# Patient Record
Sex: FEMALE | Race: BLACK OR AFRICAN AMERICAN
[De-identification: names, ages, dates, MRNs, and addresses within clinical notes are randomized per-mention and may not be internally consistent; named-entity substitution may affect disease eponyms.]

---

## 2017-02-10 ENCOUNTER — HOSPITAL ENCOUNTER (EMERGENCY)
Dept: HOSPITAL 61 - ER | Age: 32
Discharge: HOME | End: 2017-02-10
Payer: COMMERCIAL

## 2017-02-10 VITALS
SYSTOLIC BLOOD PRESSURE: 118 MMHG | DIASTOLIC BLOOD PRESSURE: 82 MMHG | SYSTOLIC BLOOD PRESSURE: 118 MMHG | DIASTOLIC BLOOD PRESSURE: 82 MMHG

## 2017-02-10 DIAGNOSIS — Z90.721: ICD-10-CM

## 2017-02-10 DIAGNOSIS — Z98.890: ICD-10-CM

## 2017-02-10 DIAGNOSIS — T19.2XXA: Primary | ICD-10-CM
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
## 2017-02-10 PROCEDURE — 87591 N.GONORRHOEAE DNA AMP PROB: CPT

## 2017-02-10 PROCEDURE — 87491 CHLMYD TRACH DNA AMP PROBE: CPT

## 2017-02-10 PROCEDURE — 99284 EMERGENCY DEPT VISIT MOD MDM: CPT

## 2017-02-10 NOTE — PHYS DOC
Past Medical History


Past Medical History:  Other


Additional Past Medical Histor:  Ovarian CA, Scoliosis


Past Surgical History:  , Oophorectomy, Other


Additional Past Surgical Histo:  Hernia repair


Alcohol Use:  None


Drug Use:  None





Adult General


Chief Complaint


Chief Complaint:  FOREIGN BODY VAGINA





HPI


HPI


Patient is a 31  year old female presents to the emergency department with a 

history of placed a cervical cap for her menstrual cycle. Patient states she 

has not been able to get the cap removed. She states she placed the yesterday. C

/o right lower abdominal pain. Denies vaginal discharge.





Review of Systems


Review of Systems





Constitutional: Denies fever or chills []


Eyes: Denies change in visual acuity, redness, or eye pain []


HENT: Denies nasal congestion or sore throat []


Respiratory: Denies cough or shortness of breath []


Cardiovascular: No additional information not addressed in HPI []


GI: Denies abdominal pain, nausea, vomiting, bloody stools or diarrhea []


: Denies dysuria or hematuria. Patient c/o unable to remove cervical cap []


Musculoskeletal: Denies back pain or joint pain []


Integument: Denies rash or skin lesions []


Neurologic: Denies headache, focal weakness or sensory changes []





Allergies


Allergies





 Allergies








Coded Allergies Type Severity Reaction Last Updated Verified


 


  No Known Drug Allergies    3/17/14 No











Physical Exam


Physical Exam





Constitutional: Well developed, well nourished, no acute distress, non-toxic 

appearance. []


HENT: Normocephalic, atraumatic, bilateral external ears normal, oropharynx 

moist, no oral exudates, nose normal. []


Eyes: PERRLA, EOMI, conjunctiva normal, no discharge. [] 


Neck: Normal range of motion, no tenderness, supple, no stridor. [] 


Cardiovascular:Heart rate regular rhythm, no murmur []


Lungs & Thorax:  Bilateral breath sounds clear to auscultation []


Abdomen: Bowel sounds hypoactive, soft, no tenderness, no masses, no pulsatile 

masses. [] 


Skin: Warm, dry, no erythema, no rash. [] 


Back: No tenderness,


Extremities: No tenderness, no cyanosis, no clubbing, ROM intact, no edema. [] 


Neurologic: Alert and oriented X 3, normal motor function, normal sensory 

function, no focal deficits noted. []


Psychologic: Affect normal, judgement normal, mood normal. []


Pelvic exam completed with cervical Noted. Cervical cap Was removed with slight 

difficulty. Cervical cap was completely intact. Foul odor was noted. Manaul 

exam with tenderness noted right adnexal and CMT no left adnexal tenderness 

noted.





Current Patient Data


Vital Signs





 Vital Signs








  Date Time  Temp Pulse Resp B/P Pulse Ox O2 Delivery O2 Flow Rate FiO2


 


2/10/17 10:02 98.5 106 16 118/82 100 Room Air  





 98.5       








Lab Values














Microbiology


2/10/17 Wet Prep - Final, Complete





EKG


EKG


[]





Radiology/Procedures


Radiology/Procedures


[]





Course & Med Decision Making


Course & Med Decision Making


Pertinent Labs and Imaging studies reviewed. (See chart for details)


Wet prep was negative. Patient will be placed on doxycycline 1 tablet twice a 

day for the next 7 days. Recommended following up with OB/GYN. Patient was 

provided with signs and symptoms to return back to emergency department. 

Recommended patient to avoid sexual intercourse until she is off the 

doxycycline for at least a week. A shunt agrees with discharge instructions 

treatment regimens and follow-up recommendations.


[]





Dragon Disclaimer


Dragon Disclaimer


This electronic medical record was generated, in whole or in part, using a 

voice recognition dictation system.





Departure


Departure


Impression:  


 Primary Impression:  


 Vaginal foreign body


Disposition:   HOME, SELF-CARE


Condition:  STABLE


Referrals:  


DANNY HOLM MD (PCP)








ROBERT BRIGHT MD


Patient Instructions:  Vaginal Foreign Body, Easy-to-Read





Additional Instructions:


Activity as tolerated.


Medications as prescribed.


Tylenol or ibuprofen for pain and discomfort.


Avoid sexual intercourse for 2 weeks.


Follow-up with OB/GYN within the next week.


Return back to emergency percent symptoms of become worse.


Scripts


Doxycycline Hyclate 100 Mg Capsule1 Cap PO BID #14 CAP


   Prov:ADRYAN GALLEGO NP         2/10/17








ADRYAN GALLEGO NP Feb 10, 2017 10:24

## 2017-03-16 ENCOUNTER — HOSPITAL ENCOUNTER (OUTPATIENT)
Dept: HOSPITAL 61 - US | Age: 32
Discharge: HOME | End: 2017-03-16
Attending: NURSE PRACTITIONER
Payer: COMMERCIAL

## 2017-03-16 DIAGNOSIS — N83.201: Primary | ICD-10-CM

## 2017-03-16 PROCEDURE — 76856 US EXAM PELVIC COMPLETE: CPT

## 2017-03-16 NOTE — RAD
Examination: Transabdominal Ultrasound pelvis



History: History of abnormal menses



Comparison: None available



Findings:



The uterus measures 8.8 x 4.7 x 2.6 cm.



The endometrium measures 1 cm in thickness. There is some echogenicity

identified within the endometrium with fluid within probably blood however in

the lower portion of the endometrium , there is a 4 mm echogenicity,

nonspecific could be of blood or blood products or a small polyp.



The right ovary measures 2.7 x 1.8 x 2.0 cm. The left ovary measures 2.2 x 1.6

x 1.4 cm.



Blood flow identified in the right and left ovaries.



There is a small cystic structure measuring 1 cm identified in the right ovary

probably a follicle.



Impression:



1. Fluid identified within the endometrium with some echogenicity within

probably blood within the endometrium. There is a 4 mm echogenicity identified

in the lower portion of the endometrium could be a blood or blood product or a

tiny polyp. Differentiation is difficult. Transvaginal ultrasound examination

can be useful better evaluation.



2. 1 cm cystic structure identified in the right ovary likely a follicle.

## 2017-04-19 ENCOUNTER — HOSPITAL ENCOUNTER (EMERGENCY)
Dept: HOSPITAL 61 - ER | Age: 32
Discharge: HOME | End: 2017-04-19
Payer: COMMERCIAL

## 2017-04-19 VITALS
SYSTOLIC BLOOD PRESSURE: 110 MMHG | DIASTOLIC BLOOD PRESSURE: 78 MMHG | DIASTOLIC BLOOD PRESSURE: 78 MMHG | SYSTOLIC BLOOD PRESSURE: 110 MMHG

## 2017-04-19 DIAGNOSIS — G43.909: ICD-10-CM

## 2017-04-19 DIAGNOSIS — J06.9: Primary | ICD-10-CM

## 2017-04-19 PROCEDURE — 96372 THER/PROPH/DIAG INJ SC/IM: CPT

## 2017-04-19 PROCEDURE — 99284 EMERGENCY DEPT VISIT MOD MDM: CPT

## 2017-04-19 NOTE — PHYS DOC
Past Medical History


Past Medical History:  Other


Additional Past Medical Histor:  Ovarian CA, Scoliosis


Past Surgical History:  , Oophorectomy, Other


Additional Past Surgical Histo:  Hernia repair


Alcohol Use:  None


Drug Use:  None





Adult General


Chief Complaint


Chief Complaint:  Congestion





HPI


HPI


Patient is a 32  year old female with history of migraine headaches who 

presents today with 8 out of 10 frontal headache, nasal congestion, and 

coughing that began yesterday. Patient denies any fever. Patient states her 

headache is consistent with her normal migraines. She states she has tried 

Imitrex and promethazine with no relief. Denies this being the worst headache 

in her life. Denies any fever. Denies any nausea vomiting and right now. She is 

complaining of photophobia as well.





Review of Systems


Review of Systems





Constitutional: Denies fever or chills []


Eyes: Denies change in visual acuity, redness, or eye pain []


HENT:  nasal congestion


Respiratory: Denies cough or shortness of breath []


Cardiovascular: No additional information not addressed in HPI []


GI: See history of present illness


: Denies dysuria or hematuria []


Musculoskeletal: Denies back pain or joint pain []


Integument: Denies rash or skin lesions []


Neurologic:  headache


Endocrine: Denies polyuria or polydipsia []





Current Medications


Current Medications








 Current Medications








 Medications


  (Trade)  Dose


 Ordered  Sig/Axel  Start Time


 Stop Time Status Last Admin


Dose Admin


 


 Diphenhydramine


 HCl


  (Benadryl)  25 mg  1X  ONCE  17 17:45


 17 17:46 DC  


 


 


 Ketorolac


 Tromethamine


  (Toradol Im)  60 mg  1X  ONCE  17 17:45


 17 17:46 DC  


 


 


 Prednisone


  (Prednisone)  60 mg  1X  ONCE  17 17:45


 17 17:46 DC  


 


 


 Prochlorperazine


 Edisylate


  (Compazine)  10 mg  1X  ONCE  17 17:45


 17 17:46 DC  


 














Allergies


Allergies





 Allergies








Coded Allergies Type Severity Reaction Last Updated Verified


 


  No Known Drug Allergies    3/17/14 No











Physical Exam


Physical Exam





Constitutional: Well developed, well nourished, no acute distress, non-toxic 

appearance. []


HENT: Normocephalic, atraumatic, bilateral external ears normal, oropharynx 

moist, no oral exudates, nose normal. []


Eyes: PERRLA, EOMI, conjunctiva normal, no discharge. [] 


Neck: Normal range of motion, no tenderness, supple, no stridor. [] 


Cardiovascular:Heart rate regular rhythm, no murmur []


Lungs & Thorax:  Bilateral breath sounds clear to auscultation []


Abdomen: Bowel sounds normal, soft, no tenderness, no masses, no pulsatile 

masses. [] 


Skin: Warm, dry, no erythema, no rash. [] 


Back: No tenderness, no CVA tenderness. [] 


Extremities: No tenderness, no cyanosis, no clubbing, ROM intact, no edema. [] 


Neurologic: Alert and oriented X 3, normal motor function, normal sensory 

function, no focal deficits noted. Cranial nerves II through XII intact


Psychologic: Affect normal, judgement normal, mood normal. []





Current Patient Data


Vital Signs





 Vital Signs








  Date Time  Temp Pulse Resp B/P Pulse Ox O2 Delivery O2 Flow Rate FiO2


 


17 17:32 98.1 98 20  98 Room Air  





 98.1       











EKG


EKG


[]





Radiology/Procedures


Radiology/Procedures


[]





Course & Med Decision Making


Course & Med Decision Making


Pertinent Labs and Imaging studies reviewed. (See chart for details)





Patient is in the ED with symptoms of an upper respiratory infection as well as 

a migraine headache. She has history of migraine headaches. She was discharged 

with Flonase, Tessalon Perles, and Claritin-D. Follow-up with her PCP in 1-2 

weeks.





Dragon Disclaimer


Dragon Disclaimer


This electronic medical record was generated, in whole or in part, using a 

voice recognition dictation system.





Departure


Departure


Impression:  


 Primary Impression:  


 Upper respiratory infection


 Additional Impressions:  


 Migraine headache


 Cough


Disposition:  01 HOME, SELF-CARE


Condition:  STABLE


Referrals:  


DANNY HOLM MD (PCP)


Follow-up with your doctor in 1-2 weeks


Patient Instructions:  Cough, Adult, Easy-to-Read, Migraine Headache, Upper 

Respiratory Infection, Adult





Additional Instructions:


You were seen with symptoms consistent of an upper respiratory infection as 

well as a migraine headache. Take the prescribed medicines as ordered. Follow-

up with your doctor in the next 7 days, come back to the ED symptoms worsen.


Scripts


Cetirizine Hcl/Pseudoephedrine (Zyrtec-D Tablet)1 Each Tab.er.12h1 Tab PO BID #

30 TAB


   Prov:MUTUNGA,PANCHO ANTOLIN         17


Cyclobenzaprine Hcl 10 Mg Tablet1 Tab PO TID #30 TAB


   Prov:QUINNPANCHO ANTOLIN         17


Fluticasone Propionate (Flonase Allergy Relief)9.9 Ml [place].susp2 Sprays NS 

DAILY #1 BOTTLE


   Prov:PANCHO BALL         17


Benzonatate (Tessalon Perle)100 Mg Capsule1 Cap PO TID #30 CAP


   Prov:PANCHO BALL         17


Prednisone 50 Mg Tablet1 Tab PO DAILY #4 TAB


   Prov:QUINNPANCHO ANTOLIN         17





Problem Qualifiers








 Primary Impression:  


 Upper respiratory infection


 URI type:  unspecified URI  Qualified Code:  J06.9 - Acute upper respiratory 

infection, unspecified


 Additional Impressions:  


 Migraine headache


 Migraine type:  unspecified  Status migrainosus presence:  without status 

migrainosus  Intractability:  not intractable  Qualified Code:  G43.909 - 

Migraine, unspecified, not intractable, without status migrainosus





PANCHO BALL 2017 17:47

## 2017-06-27 ENCOUNTER — HOSPITAL ENCOUNTER (OUTPATIENT)
Dept: HOSPITAL 61 - RAD | Age: 32
Discharge: HOME | End: 2017-06-27
Attending: FAMILY MEDICINE
Payer: COMMERCIAL

## 2017-06-27 DIAGNOSIS — J45.901: Primary | ICD-10-CM

## 2017-06-27 DIAGNOSIS — M41.84: ICD-10-CM

## 2017-06-27 PROCEDURE — 71020: CPT

## 2017-06-27 NOTE — RAD
Indication: Asthma attack.



Time of exam 12:35 PM



Correlation is made with prior study from 1/23/2017.



Right convexity thoracic scoliotic curvature is noted. The heart size is

stable. The lungs are clear. No infiltrate is detected. No effusion or

pneumothorax is identified.



Impression: No acute cardiopulmonary process is detected.

## 2017-10-25 ENCOUNTER — HOSPITAL ENCOUNTER (EMERGENCY)
Dept: HOSPITAL 61 - ER | Age: 32
Discharge: HOME | End: 2017-10-25
Payer: COMMERCIAL

## 2017-10-25 VITALS — SYSTOLIC BLOOD PRESSURE: 97 MMHG | DIASTOLIC BLOOD PRESSURE: 56 MMHG

## 2017-10-25 VITALS — BODY MASS INDEX: 19.83 KG/M2 | HEIGHT: 60 IN | WEIGHT: 101 LBS
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
## 2017-10-25 DIAGNOSIS — S16.1XXA: Primary | ICD-10-CM

## 2017-10-25 DIAGNOSIS — R51: ICD-10-CM
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
## 2017-10-25 PROCEDURE — 70450 CT HEAD/BRAIN W/O DYE: CPT

## 2017-10-25 PROCEDURE — 72125 CT NECK SPINE W/O DYE: CPT

## 2017-10-25 NOTE — RAD
Examination: CT head and cervical spine without contrast



History: History of neck pain, injury, dizziness. Comparison: CT head from

2/5/2015



Technique: Axial CT images of the head and cervical spine was performed with

the contrast. Coronal and sagittal reformats of the cervical spine were

performed





[facility] Compliance Statement:



One or more of the following individualized dose reduction techniques were

utilized for this examination:

1. Automated exposure control

2. Adjustment of the mA and/or kV according to patient size

3. Use of iterative reconstruction technique . Findings



There is no evidence of midline shift. There is no acute intracranial bleed or

extra axial fluid collection identified. The gray-white matter different

sensation is maintained. Cavum septum pellucidum identified. The visualized

lateral ventricles, third ventricle, fourth ventricle are proper for age. The

basal cisterns are uneffaced. Probable Chiari1 formation similar to prior

exam.



The vertebral body heights are maintained. There is no obvious acute fracture

identified. The lateral masses of C1 are aligned with C2 vertebra. The C2 dens

appears intact. No evidence of prevertebral soft tissue swelling identified.

No evidence of listhesis. The apical lungs are clear. No evidence of

prevertebral soft tissue swelling. Examination cervical spine somewhat limited

due to positioning within the CT gantry.



Impression:



1. No evidence of intracranial findings.



2. 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



2. No acute fracture of the cervical spine. Correlate clinically.
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
## 2018-04-06 ENCOUNTER — HOSPITAL ENCOUNTER (EMERGENCY)
Dept: HOSPITAL 61 - ER | Age: 33
Discharge: HOME | End: 2018-04-06
Payer: COMMERCIAL

## 2018-04-06 DIAGNOSIS — G43.909: Primary | ICD-10-CM

## 2018-04-06 DIAGNOSIS — J06.9: ICD-10-CM

## 2018-04-06 LAB
INFLUENZA A PATIENT: NEGATIVE
INFLUENZA B PATIENT: NEGATIVE
OBC FLU: (no result)

## 2018-04-06 PROCEDURE — 87070 CULTURE OTHR SPECIMN AEROBIC: CPT

## 2018-04-06 PROCEDURE — 99285 EMERGENCY DEPT VISIT HI MDM: CPT

## 2018-04-06 PROCEDURE — 87880 STREP A ASSAY W/OPTIC: CPT

## 2018-04-06 PROCEDURE — 71046 X-RAY EXAM CHEST 2 VIEWS: CPT

## 2018-04-06 PROCEDURE — 96372 THER/PROPH/DIAG INJ SC/IM: CPT

## 2018-04-06 PROCEDURE — 87804 INFLUENZA ASSAY W/OPTIC: CPT

## 2018-04-06 RX ADMIN — DEXAMETHASONE SODIUM PHOSPHATE 1 MG: 4 INJECTION, SOLUTION INTRAMUSCULAR; INTRAVENOUS at 19:10

## 2018-04-06 RX ADMIN — KETOROLAC TROMETHAMINE 1 MG: 30 INJECTION, SOLUTION INTRAMUSCULAR at 19:10

## 2018-04-07 LAB
NEGATIVE OBC STREP: (no result)
POSITIVE OBC STREP: (no result)

## 2018-07-19 ENCOUNTER — HOSPITAL ENCOUNTER (OUTPATIENT)
Dept: HOSPITAL 61 - RAD | Age: 33
Discharge: HOME | End: 2018-07-19
Attending: FAMILY MEDICINE
Payer: COMMERCIAL

## 2018-07-19 DIAGNOSIS — J32.9: ICD-10-CM

## 2018-07-19 DIAGNOSIS — R06.02: Primary | ICD-10-CM

## 2018-07-19 DIAGNOSIS — G43.909: ICD-10-CM

## 2018-07-19 PROCEDURE — 71046 X-RAY EXAM CHEST 2 VIEWS: CPT

## 2018-09-30 ENCOUNTER — HOSPITAL ENCOUNTER (EMERGENCY)
Dept: HOSPITAL 61 - ER | Age: 33
Discharge: HOME | End: 2018-09-30
Payer: COMMERCIAL

## 2018-09-30 VITALS — WEIGHT: 83 LBS | HEIGHT: 60 IN | BODY MASS INDEX: 16.3 KG/M2

## 2018-09-30 VITALS — DIASTOLIC BLOOD PRESSURE: 73 MMHG | SYSTOLIC BLOOD PRESSURE: 110 MMHG

## 2018-09-30 DIAGNOSIS — R11.2: ICD-10-CM

## 2018-09-30 DIAGNOSIS — Z98.890: ICD-10-CM

## 2018-09-30 DIAGNOSIS — G43.009: Primary | ICD-10-CM

## 2018-09-30 LAB
ANION GAP SERPL CALC-SCNC: 11 MMOL/L (ref 6–14)
BASOPHILS # BLD AUTO: 0.1 X10^3/UL (ref 0–0.2)
BASOPHILS NFR BLD: 1 % (ref 0–3)
BUN SERPL-MCNC: 19 MG/DL (ref 7–20)
CALCIUM SERPL-MCNC: 9.3 MG/DL (ref 8.5–10.1)
CHLORIDE SERPL-SCNC: 104 MMOL/L (ref 98–107)
CO2 SERPL-SCNC: 24 MMOL/L (ref 21–32)
CREAT SERPL-MCNC: 0.8 MG/DL (ref 0.6–1)
EOSINOPHIL NFR BLD: 0.1 X10^3/UL (ref 0–0.7)
EOSINOPHIL NFR BLD: 1 % (ref 0–3)
ERYTHROCYTE [DISTWIDTH] IN BLOOD BY AUTOMATED COUNT: 13.1 % (ref 11.5–14.5)
GFR SERPLBLD BASED ON 1.73 SQ M-ARVRAT: 100 ML/MIN
GLUCOSE SERPL-MCNC: 91 MG/DL (ref 70–99)
HCT VFR BLD CALC: 35.3 % (ref 36–47)
HGB BLD-MCNC: 11.9 G/DL (ref 12–15.5)
LYMPHOCYTES # BLD: 2.7 X10^3/UL (ref 1–4.8)
LYMPHOCYTES NFR BLD AUTO: 43 % (ref 24–48)
MCH RBC QN AUTO: 30 PG (ref 25–35)
MCHC RBC AUTO-ENTMCNC: 34 G/DL (ref 31–37)
MCV RBC AUTO: 90 FL (ref 79–100)
MONO #: 0.4 X10^3/UL (ref 0–1.1)
MONOCYTES NFR BLD: 6 % (ref 0–9)
NEUT #: 3 X10^3UL (ref 1.8–7.7)
NEUTROPHILS NFR BLD AUTO: 48 % (ref 31–73)
PLATELET # BLD AUTO: 347 X10^3/UL (ref 140–400)
POTASSIUM SERPL-SCNC: 3.8 MMOL/L (ref 3.5–5.1)
RBC # BLD AUTO: 3.94 X10^6/UL (ref 3.5–5.4)
SODIUM SERPL-SCNC: 139 MMOL/L (ref 136–145)
WBC # BLD AUTO: 6.2 X10^3/UL (ref 4–11)

## 2018-09-30 PROCEDURE — 81025 URINE PREGNANCY TEST: CPT

## 2018-09-30 PROCEDURE — 96361 HYDRATE IV INFUSION ADD-ON: CPT

## 2018-09-30 PROCEDURE — 36415 COLL VENOUS BLD VENIPUNCTURE: CPT

## 2018-09-30 PROCEDURE — 85025 COMPLETE CBC W/AUTO DIFF WBC: CPT

## 2018-09-30 PROCEDURE — 80048 BASIC METABOLIC PNL TOTAL CA: CPT

## 2018-09-30 PROCEDURE — 99284 EMERGENCY DEPT VISIT MOD MDM: CPT

## 2018-09-30 PROCEDURE — 96375 TX/PRO/DX INJ NEW DRUG ADDON: CPT

## 2018-09-30 PROCEDURE — 96374 THER/PROPH/DIAG INJ IV PUSH: CPT

## 2018-09-30 NOTE — PHYS DOC
Past Medical History


Past Medical History:  Cancer, Other


Additional Past Medical Histor:  Ovarian CA, Scoliosis


Past Surgical History:  , Oophorectomy, Other


Additional Past Surgical Histo:  Hernia repair,R OVARY


Alcohol Use:  None


Drug Use:  None





Adult General


Chief Complaint


Chief Complaint:  HEADACHE





HPI


HPI





Patient is a 33  year old female who presents with 8/10 throbbing frontal 

headache that began on Friday which is 3 days ago. Patient states the headache 

began gradually. Patient states she's also had nausea and vomiting since the 

headache began. Patient denies any fever. She states she has history of 

migraine headaches and this is similar to her previous migraines.





Review of Systems


Review of Systems





Constitutional: Denies fever or chills []


Eyes: Denies change in visual acuity, redness, or eye pain []


HENT: Denies nasal congestion or sore throat []


Respiratory: Denies cough or shortness of breath []


Cardiovascular: No additional information not addressed in HPI []


GI: Reports nausea and vomiting. Denies abdominal pain, bloody stools or 

diarrhea []


: Denies dysuria or hematuria []


Musculoskeletal: Denies back pain or joint pain []


Integument: Denies rash or skin lesions []


Neurologic: Reports migraine headache, denies focal weakness or sensory changes 

[]








All other systems were reviewed and found to be within normal limits, except as 

documented in this note.





Current Medications


Current Medications





Current Medications








 Medications


  (Trade)  Dose


 Ordered  Sig/Axel  Start Time


 Stop Time Status Last Admin


Dose Admin


 


 Dexamethasone


 Sodium Phosphate


  (Decadron)  10 mg  1X  ONCE  18 12:30


 18 12:31 DC 18 12:49


10 MG


 


 Diphenhydramine


 HCl


  (Benadryl)  25 mg  1X  ONCE  18 12:30


 18 12:31 DC 18 12:48


25 MG


 


 Ketorolac


 Tromethamine


  (Toradol 30mg


 Vial)  30 mg  1X  ONCE  18 12:30


 18 12:31 DC 18 12:49


30 MG


 


 Prochlorperazine


 Edisylate


  (Compazine)  10 mg  1X  ONCE  18 12:30


 18 12:31 DC 18 12:49


10 MG


 


 Sodium Chloride  1,000 ml @ 


 1,000 mls/hr  1X  ONCE  18 12:30


 18 13:29 DC 18 12:47


1,000 MLS/HR











Allergies


Allergies





Allergies








Coded Allergies Type Severity Reaction Last Updated Verified


 


  No Known Drug Allergies    3/17/14 No











Physical Exam


Physical Exam





Constitutional: Well developed, well nourished, no acute distress, non-toxic 

appearance. []


HENT: Normocephalic, atraumatic, bilateral external ears normal, oropharynx 

moist, no oral exudates, nose normal. []


Eyes: PERRLA, EOMI, conjunctiva normal, no discharge. [] 


Neck: Normal range of motion, no tenderness, supple, no stridor. [] 


Cardiovascular:Heart rate regular rhythm, no murmur []


Lungs & Thorax:  Bilateral breath sounds clear to auscultation []


Abdomen: Bowel sounds normal, soft, no tenderness, no masses, no pulsatile 

masses. [] 


Skin: Warm, dry, no erythema, no rash. [] 


Back: No tenderness, no CVA tenderness. [] 


Extremities: No tenderness, no cyanosis, no clubbing, ROM intact, no edema. [] 


Neurologic: Alert and oriented X 3, normal motor function, normal sensory 

function, no focal deficits noted. Cranial nerves II through XII intact


Psychologic: Affect normal, judgement normal, mood normal. []





Current Patient Data


Vital Signs





 Vital Signs








  Date Time  Temp Pulse Resp B/P (MAP) Pulse Ox O2 Delivery O2 Flow Rate FiO2


 


18 12:04 98.2 123 16 113/78 (90) 98 Room Air  





 98.2       








Lab Values





 Laboratory Tests








Test


 18


12:13 18


12:20


 


POC Urine HCG, Qualitative


 Hcg negative


(Negative) 





 


White Blood Count


 


 6.2 x10^3/uL


(4.0-11.0)


 


Red Blood Count


 


 3.94 x10^6/uL


(3.50-5.40)


 


Hemoglobin


 


 11.9 g/dL


(12.0-15.5)  L


 


Hematocrit


 


 35.3 %


(36.0-47.0)  L


 


Mean Corpuscular Volume


 


 90 fL ()





 


Mean Corpuscular Hemoglobin  30 pg (25-35)  


 


Mean Corpuscular Hemoglobin


Concent 


 34 g/dL


(31-37)


 


Red Cell Distribution Width


 


 13.1 %


(11.5-14.5)


 


Platelet Count


 


 347 x10^3/uL


(140-400)


 


Neutrophils (%) (Auto)  48 % (31-73)  


 


Lymphocytes (%) (Auto)  43 % (24-48)  


 


Monocytes (%) (Auto)  6 % (0-9)  


 


Eosinophils (%) (Auto)  1 % (0-3)  


 


Basophils (%) (Auto)  1 % (0-3)  


 


Neutrophils # (Auto)


 


 3.0 x10^3uL


(1.8-7.7)


 


Lymphocytes # (Auto)


 


 2.7 x10^3/uL


(1.0-4.8)


 


Monocytes # (Auto)


 


 0.4 x10^3/uL


(0.0-1.1)


 


Eosinophils # (Auto)


 


 0.1 x10^3/uL


(0.0-0.7)


 


Basophils # (Auto)


 


 0.1 x10^3/uL


(0.0-0.2)


 


Sodium Level


 


 139 mmol/L


(136-145)


 


Potassium Level


 


 3.8 mmol/L


(3.5-5.1)


 


Chloride Level


 


 104 mmol/L


()


 


Carbon Dioxide Level


 


 24 mmol/L


(21-32)


 


Anion Gap  11 (6-14)  


 


Blood Urea Nitrogen


 


 19 mg/dL


(7-20)


 


Creatinine


 


 0.8 mg/dL


(0.6-1.0)


 


Estimated GFR


(Cockcroft-Gault) 


 100.0  





 


Glucose Level


 


 91 mg/dL


(70-99)


 


Calcium Level


 


 9.3 mg/dL


(8.5-10.1)





 Laboratory Tests


18 12:20








 Laboratory Tests


18 12:20














EKG


EKG


[]





Radiology/Procedures


Radiology/Procedures


[]





Course & Med Decision Making


Course & Med Decision Making


Pertinent Labs and Imaging studies reviewed. (See chart for details)





This is a 33-year-old female patient presented to the ED today with 

exacerbation of chronic migraine headache, hx of similar migraine headaches. 

She does not have neurological deficits. She was given Toradol, IV fluids, 

Decadron, Compazine, Benadryl, without relief. She has been tachycardic since 

arrival to the ED. Patient tends to be tachycardic whenever he comes to the ED. 

There is nothing unusual about her headache today. She was discharged with 

Imitrex and promethazine and instructed to follow-up with her on PCP in 1-2 

weeks. Also provided neurologist for frequent migraines.





Dragon Disclaimer


Dragon Disclaimer


This electronic medical record was generated, in whole or in part, using a 

voice recognition dictation system.





Departure


Departure


Impression:  


 Primary Impression:  


 Migraine headache


Disposition:   HOME, SELF-CARE


Condition:  STABLE (alcohol)


Referrals:  


DANNY HOLM MD (PCP)


follow up in 2 weeks





JELENA MCDOWELL MD


Patient Instructions:  Migraine Headache





Additional Instructions:  


You have a migraine headache. Complete your medications, take them as 

prescribed. Follow-up with your own doctor as well as the neurologist as soon 

as possible.


Scripts


Promethazine Hcl (PROMETHAZINE HCL) 25 Mg Tablet


1 TAB PO PRN Q6HRS, #30 TAB


   Prov: PANCHO BALL APRN         18 


Sumatriptan Succinate (IMITREX) 50 Mg Tablet


1 TAB PO UD, #9 TAB 1 Refill


   Prov: PANCHO BALL APRN         18





Problem Qualifiers








 Primary Impression:  


 Migraine headache


 Migraine type:  without aura  Status migrainosus presence:  without status 

migrainosus  Intractability:  not intractable  Qualified Codes:  G43.009 - 

Migraine without aura, not intractable, without status migrainosus








PANCHO BALL Sep 30, 2018 12:24

## 2018-10-04 ENCOUNTER — HOSPITAL ENCOUNTER (EMERGENCY)
Dept: HOSPITAL 61 - ER | Age: 33
Discharge: HOME | End: 2018-10-04
Payer: COMMERCIAL

## 2018-10-04 VITALS — BODY MASS INDEX: 15.27 KG/M2 | HEIGHT: 62 IN | WEIGHT: 83 LBS

## 2018-10-04 VITALS — DIASTOLIC BLOOD PRESSURE: 82 MMHG | SYSTOLIC BLOOD PRESSURE: 115 MMHG

## 2018-10-04 DIAGNOSIS — K64.9: Primary | ICD-10-CM

## 2018-10-04 DIAGNOSIS — Z90.722: ICD-10-CM

## 2018-10-04 DIAGNOSIS — Z85.43: ICD-10-CM

## 2018-10-04 DIAGNOSIS — Z98.890: ICD-10-CM

## 2018-10-04 PROCEDURE — 99283 EMERGENCY DEPT VISIT LOW MDM: CPT

## 2018-10-04 NOTE — PHYS DOC
Past Medical History


Past Medical History:  Cancer, Other


Additional Past Medical Histor:  Ovarian CA, Scoliosis


Past Surgical History:  , Oophorectomy, Other


Additional Past Surgical Histo:  Hernia repair,R OVARY


Alcohol Use:  None


Drug Use:  None





Adult General


Chief Complaint


Chief Complaint:  OTHER COMPLAINTS





[facility]





Patient is a 33  year old female who presents complaining of hemorrhoid to her 

rectum that she noted this morning. Patient denies any abdominal pain nausea 

vomiting.





Review of Systems


Review of Systems





Constitutional: Denies fever or chills []


Eyes: Denies change in visual acuity, redness, or eye pain []


HENT: Denies nasal congestion or sore throat []


Respiratory: Denies cough or shortness of breath []


Cardiovascular: No additional information not addressed in HPI []


GI: Reports hemorrhoid. Denies abdominal pain, nausea, vomiting, bloody stools 

or diarrhea []


: Denies dysuria or hematuria []


Musculoskeletal: Denies back pain or joint pain []


Integument: Denies rash or skin lesions []


Neurologic: Denies headache, focal weakness or sensory changes []








All other systems were reviewed and found to be within normal limits, except as 

documented in this note.





Allergies


Allergies





Allergies








Coded Allergies Type Severity Reaction Last Updated Verified


 


  No Known Drug Allergies    3/17/14 No











Physical Exam


Physical Exam





Constitutional: Well developed, well nourished, no acute distress, non-toxic 

appearance. []


HENT: Normocephalic, atraumatic, bilateral external ears normal, oropharynx 

moist, no oral exudates, nose normal. []


Eyes: PERRLA, EOMI, conjunctiva normal, no discharge. [] 


Neck: Normal range of motion, no tenderness, supple, no stridor. [] 


Cardiovascular:Heart rate regular rhythm, no murmur []


Lungs & Thorax:  Bilateral breath sounds clear to auscultation []


Abdomen: Bowel sounds normal, soft, no tenderness, no masses, no pulsatile 

masses. [] 


Rectal exam


Patient declined, she showed me a picture of her rectum that looks like she has 

2 peanut size hemorrhoids.


Skin: Warm, dry, no erythema, no rash. [] 


Back: No tenderness, no CVA tenderness. [] 


Extremities: No tenderness, no cyanosis, no clubbing, ROM intact, no edema. [] 


Neurologic: Alert and oriented X 3, normal motor function, normal sensory 

function, no focal deficits noted. []


Psychologic: Affect normal, judgement normal, mood normal. []





Current Patient Data


Vital Signs





 Vital Signs








  Date Time  Temp Pulse Resp B/P (MAP) Pulse Ox O2 Delivery O2 Flow Rate FiO2


 


10/4/18 18:35 98.2 110 16 115/82 (93) 100 Room Air  





 98.2       











EKG


EKG


[]





Radiology/Procedures


Radiology/Procedures


[]





Course & Med Decision Making


Course & Med Decision Making


Pertinent Labs and Imaging studies reviewed. (See chart for details)





This is a 33-year-old female patient presented to the ED today complaining of 

rectal pain due to hemorrhoids. Patient will not let me do a physical exam to 

evaluate the hemorrhoids. She only showed to me the picture on her cell phone 

that she took early this morning. Patient denies constipation. She was 

discharged with Anusol cream, lidocaine cream, MiraLAX, and encouraged to 

increase her dietary fiber intake. Encouraged her to avoid any narcotics pain 

medicines. She is well known to this ED for Migraine headaches. F/u with 

general surgery.





Dragon Disclaimer


Dragon Disclaimer


This electronic medical record was generated, in whole or in part, using a 

voice recognition dictation system.





Departure


Departure


Impression:  


 Primary Impression:  


 Hemorrhoids


Disposition:  01 HOME, SELF-CARE


Condition:  STABLE


Referrals:  


DANNY HOLM MD (PCP)








BRANDON PRICE MD


Follow-up in one week


Patient Instructions:  Hemorrhoids, Easy-to-Read





Additional Instructions:  


You were evaluated in the emergency room, from the pictures you showed us it 

look like you have hemorrhoids. Try and use MiraLAX every day, increase your 

dietary fiber intake as well as water intake, avoid any narcotic pain medicines 

including tramadol. Use the rest of the prescribed medications as ordered. 

Follow-up with the primary care doctor as well as general surgeon provided in 

one week.


Scripts


Polyethylene Glycol 3350 (MIRALAX) 17 Gm Powd.pack


1 PACKET PO DAILY, #30 PACKET 3 Refills


   Prov: MUTUNGA,PANCHO APRN         10/4/18 


Hydrocortisone Acetate (ANUSOL-HC) 25 Mg Supp.rect


1 SUPP RC BID, #14 SUPP


   Prov: MUTUNGA,PANCHO APRN         10/4/18 


Lidocaine/Prilocaine (LIDOCAINE-PRILOCAINE CREAM) 30 Gm Cream..g.


1 AMANDA TP Q4-6HRS PRN for PAIN, #30 GM 1 Refill


   Apply to exterior rectal area


   Prov: PANCHO BALL         10/4/18





Problem Qualifiers








 Primary Impression:  


 Hemorrhoids


 Hemorrhoid type:  unspecified  Qualified Codes:  K64.9 - Unspecified 

hemorrhoids








PANCHO BALL Oct 4, 2018 18:55

## 2019-01-26 ENCOUNTER — HOSPITAL ENCOUNTER (EMERGENCY)
Dept: HOSPITAL 61 - ER | Age: 34
Discharge: HOME | End: 2019-01-26
Payer: COMMERCIAL

## 2019-01-26 VITALS — HEIGHT: 60 IN | BODY MASS INDEX: 15.9 KG/M2 | WEIGHT: 81 LBS

## 2019-01-26 VITALS — SYSTOLIC BLOOD PRESSURE: 121 MMHG | DIASTOLIC BLOOD PRESSURE: 82 MMHG

## 2019-01-26 DIAGNOSIS — J02.9: Primary | ICD-10-CM

## 2019-01-26 DIAGNOSIS — Z90.721: ICD-10-CM

## 2019-01-26 DIAGNOSIS — M79.18: ICD-10-CM

## 2019-01-26 DIAGNOSIS — R50.9: ICD-10-CM

## 2019-01-26 DIAGNOSIS — R05: ICD-10-CM

## 2019-01-26 DIAGNOSIS — Z98.890: ICD-10-CM

## 2019-01-26 LAB
INFLUENZA A PATIENT: NEGATIVE
INFLUENZA B PATIENT: NEGATIVE

## 2019-01-26 PROCEDURE — 87804 INFLUENZA ASSAY W/OPTIC: CPT

## 2019-01-26 PROCEDURE — 99283 EMERGENCY DEPT VISIT LOW MDM: CPT

## 2019-01-26 PROCEDURE — 87880 STREP A ASSAY W/OPTIC: CPT

## 2019-01-26 NOTE — PHYS DOC
Past Medical History


Past Medical History:  Cancer, Other


Additional Past Medical Histor:  Ovarian CA, Scoliosis


Past Surgical History:  , Oophorectomy, Other


Additional Past Surgical Histo:  Hernia repair,R OVARY


Alcohol Use:  None


Drug Use:  None





Adult General


Chief Complaint


Chief Complaint:  FLU SYMPTOM





HPI


HPI





Patient is a 33  year old female who presents with throat, cough and body aches 

since Thursday. Patient states last night was last fever she had. Afebrile in 

the ED. Patient states she's been taking Benadryl and Tessalon Perles but is 

not working.





Review of Systems


Review of Systems





Constitutional: Denies fever or chills []


Eyes: Denies change in visual acuity, redness, or eye pain []


HEN nasal congestion or sore throat []


Respiratory cough or denies shortness of breath []


Cardiovascular: No additional information not addressed in HPI []


GI: Denies abdominal pain, nausea, vomiting, bloody stools or diarrhea []


: Denies dysuria or hematuria []


Musculoskeletal: Body aches Denies back pain or joint pain []


Integument: Denies rash or skin lesions []


Neurologic: Denies headache, focal weakness or sensory changes []


[]





All other systems were reviewed and found to be within normal limits, except as 

documented in this note.





Allergies


Allergies





Allergies








Coded Allergies Type Severity Reaction Last Updated Verified


 


  No Known Drug Allergies    3/17/14 No











Physical Exam


Physical Exam





Constitutional: Well developed, well nourished, no acute distress, non-toxic 

appearance. []


HENT: Normocephalic, atraumatic, bilateral external ears normal, oropharynx 

moist, no oral exudates, nose normal. Throat reddened[]


Eyes: PERRLA, EOMI, conjunctiva normal, no discharge. [] 


Neck: Normal range of motion, no tenderness, supple, no stridor. [] 


Cardiovascular:Heart rate regular rhythm, no murmur []


Lungs & Thorax:  Bilateral breath sounds clear to auscultation []


Abdomen: Bowel sounds normal, soft, no tenderness, no masses, no pulsatile 

masses. [] 


Skin: Warm, dry, no erythema, no rash. [] 


Back: No tenderness, no CVA tenderness. [] 


Extremities: No tenderness, no cyanosis, no clubbing, ROM intact, no edema. [] 


Neurologic: Alert and oriented X 3, normal motor function, normal sensory 

function, no focal deficits noted. []


Psychologic: Affect normal, judgement normal, mood normal. []





Current Patient Data


Vital Signs





 Vital Signs








  Date Time  Temp Pulse Resp B/P (MAP) Pulse Ox O2 Delivery O2 Flow Rate FiO2


 


19 12:04 98.8 124 18 121/82 (95) 99 Room Air  





 98.8       








Lab Values





 Laboratory Tests








Test


 19


12:12


 


Influenza Type A Antigen


 Negative


(NEGATIVE)


 


Influenza Type B Antigen


 Negative


(NEGATIVE)











EKG


EKG


[]





Radiology/Procedures


Radiology/Procedures


[]





Course & Med Decision Making


Course & Med Decision Making


Patient is a 33  year old female who presents with throat, cough and body aches 

since Thursday. Patient states last night was last fever she had. Afebrile in 

the ED. Patient states she's been taking Benadryl and Tessalon Perles but is 

not working. Alert and oriented. Skin pink warm and dry. Mucus membranes are 

moist. Throat is red but there are no exudates or swelling. Bilateral ear 

tympanic are pearly white. Lungs are clear in all lobes. Ambulatory with a 

steady gait. Vital Signs within normal limits. Abdomen is soft and nontender. 

Patient denies nausea, vomiting, chest pain, headache. Rapid strep and rapid 

flu are negative.





 Patient is to follow-up with her primary care provider and take medications as 

prescribed. Patient is to drink plenty of fluids.





Dragon Disclaimer


Dragon Disclaimer


This electronic medical record was generated, in whole or in part, using a 

voice recognition dictation system.





Departure


Departure


Impression:  


 Primary Impression:  


 Cough


 Additional Impressions:  


 Headache


 Fever


Disposition:  01 HOME, SELF-CARE


Condition:  STABLE


Referrals:  


DANNY HOLM MD (PCP)


Patient Instructions:  Upper Respiratory Infection, Adult





Additional Instructions:  


Follow-up with primary care provider. Continue taking Mucinex. Take medications 

as prescribed. Use ibuprofen for pain. Drink plenty of fluids.


Scripts


Guaifenesin/Codeine Phosphate (CHERATUSSIN AC SYRUP) 118 Ml Liquid


5 ML PO PRN Q6HRS for 5 Days, #120 ML


   Prov: ADRYAN POSADA APRN         19 


Methylprednisolone (MEDROL) 4 Mg Tab.ds.pk


1 PKG PO UD, #1 PKG


   Prov: ADRYNA POSADA APRN         19 


Azithromycin (AZITHROMYCIN TABLET) 250 Mg Tablet


1 PKG PO UD, #6 TAB


   Prov: ADRYAN POSADA         19





Problem Qualifiers








 Additional Impressions:  


 Headache


 Headache type:  unspecified  Headache chronicity pattern:  unspecified pattern

  Intractability:  not intractable  Qualified Codes:  R51 - Headache


 Fever


 Fever type:  unspecified  Qualified Codes:  R50.9 - Fever, unspecified








ADRYAN POSADA 2019 13:20

## 2020-02-17 ENCOUNTER — HOSPITAL ENCOUNTER (EMERGENCY)
Dept: HOSPITAL 61 - ER | Age: 35
Discharge: HOME | End: 2020-02-17
Payer: SELF-PAY

## 2020-02-17 VITALS — HEIGHT: 60 IN | BODY MASS INDEX: 17.1 KG/M2 | WEIGHT: 87.08 LBS

## 2020-02-17 VITALS — SYSTOLIC BLOOD PRESSURE: 111 MMHG | DIASTOLIC BLOOD PRESSURE: 73 MMHG

## 2020-02-17 DIAGNOSIS — J06.9: Primary | ICD-10-CM

## 2020-02-17 DIAGNOSIS — R51: ICD-10-CM

## 2020-02-17 DIAGNOSIS — Z98.890: ICD-10-CM

## 2020-02-17 DIAGNOSIS — R42: ICD-10-CM

## 2020-02-17 DIAGNOSIS — H53.8: ICD-10-CM

## 2020-02-17 DIAGNOSIS — M41.9: ICD-10-CM

## 2020-02-17 DIAGNOSIS — R11.0: ICD-10-CM

## 2020-02-17 DIAGNOSIS — R07.89: ICD-10-CM

## 2020-02-17 DIAGNOSIS — R53.83: ICD-10-CM

## 2020-02-17 DIAGNOSIS — Z90.89: ICD-10-CM

## 2020-02-17 DIAGNOSIS — Z85.9: ICD-10-CM

## 2020-02-17 DIAGNOSIS — Z79.899: ICD-10-CM

## 2020-02-17 LAB
ALBUMIN SERPL-MCNC: 3.7 G/DL (ref 3.4–5)
ALBUMIN/GLOB SERPL: 0.7 {RATIO} (ref 1–1.7)
ALP SERPL-CCNC: 66 U/L (ref 46–116)
ALT SERPL-CCNC: 16 U/L (ref 14–59)
ANION GAP SERPL CALC-SCNC: 11 MMOL/L (ref 6–14)
APTT BLD: 29 SEC (ref 24–38)
APTT PPP: YELLOW S
AST SERPL-CCNC: 15 U/L (ref 15–37)
BACTERIA #/AREA URNS HPF: (no result) /HPF
BASOPHILS # BLD AUTO: 0 X10^3/UL (ref 0–0.2)
BASOPHILS NFR BLD: 1 % (ref 0–3)
BILIRUB SERPL-MCNC: 0.2 MG/DL (ref 0.2–1)
BILIRUB UR QL STRIP: (no result)
BUN SERPL-MCNC: 24 MG/DL (ref 7–20)
BUN/CREAT SERPL: 27 (ref 6–20)
CALCIUM SERPL-MCNC: 8.7 MG/DL (ref 8.5–10.1)
CHLORIDE SERPL-SCNC: 105 MMOL/L (ref 98–107)
CK SERPL-CCNC: 48 U/L (ref 26–192)
CO2 SERPL-SCNC: 25 MMOL/L (ref 21–32)
CREAT SERPL-MCNC: 0.9 MG/DL (ref 0.6–1)
D DIMER PPP FEU-MCNC: 0.53 UG/MLFEU (ref 0–0.5)
EOSINOPHIL NFR BLD: 0.1 X10^3/UL (ref 0–0.7)
EOSINOPHIL NFR BLD: 2 % (ref 0–3)
ERYTHROCYTE [DISTWIDTH] IN BLOOD BY AUTOMATED COUNT: 13.5 % (ref 11.5–14.5)
FIBRINOGEN PPP-MCNC: CLEAR MG/DL
GFR SERPLBLD BASED ON 1.73 SQ M-ARVRAT: 86.7 ML/MIN
GLOBULIN SER-MCNC: 5 G/DL (ref 2.2–3.8)
GLUCOSE SERPL-MCNC: 87 MG/DL (ref 70–99)
HCT VFR BLD CALC: 32.6 % (ref 36–47)
HGB BLD-MCNC: 10.8 G/DL (ref 12–15.5)
INFLUENZA A PATIENT: NEGATIVE
INFLUENZA B PATIENT: NEGATIVE
LYMPHOCYTES # BLD: 2.5 X10^3/UL (ref 1–4.8)
LYMPHOCYTES NFR BLD AUTO: 51 % (ref 24–48)
MAGNESIUM SERPL-MCNC: 1.9 MG/DL (ref 1.8–2.4)
MCH RBC QN AUTO: 33 PG (ref 25–35)
MCHC RBC AUTO-ENTMCNC: 33 G/DL (ref 31–37)
MCV RBC AUTO: 99 FL (ref 79–100)
MONO #: 0.4 X10^3/UL (ref 0–1.1)
MONOCYTES NFR BLD: 9 % (ref 0–9)
NEUT #: 1.9 X10^3/UL (ref 1.8–7.7)
NEUTROPHILS NFR BLD AUTO: 38 % (ref 31–73)
NITRITE UR QL STRIP: NEGATIVE
PH UR STRIP: 6 [PH]
PLATELET # BLD AUTO: 350 X10^3/UL (ref 140–400)
POTASSIUM SERPL-SCNC: 3.9 MMOL/L (ref 3.5–5.1)
PROT SERPL-MCNC: 8.7 G/DL (ref 6.4–8.2)
PROT UR STRIP-MCNC: NEGATIVE MG/DL
PROTHROMBIN TIME: 13.2 SEC (ref 11.7–14)
RBC # BLD AUTO: 3.31 X10^6/UL (ref 3.5–5.4)
RBC #/AREA URNS HPF: (no result) /HPF (ref 0–2)
SODIUM SERPL-SCNC: 141 MMOL/L (ref 136–145)
SQUAMOUS #/AREA URNS LPF: (no result) /LPF
UROBILINOGEN UR-MCNC: 1 MG/DL
WBC # BLD AUTO: 4.9 X10^3/UL (ref 4–11)
WBC #/AREA URNS HPF: (no result) /HPF (ref 0–4)

## 2020-02-17 PROCEDURE — 96361 HYDRATE IV INFUSION ADD-ON: CPT

## 2020-02-17 PROCEDURE — 84484 ASSAY OF TROPONIN QUANT: CPT

## 2020-02-17 PROCEDURE — 71275 CT ANGIOGRAPHY CHEST: CPT

## 2020-02-17 PROCEDURE — 99285 EMERGENCY DEPT VISIT HI MDM: CPT

## 2020-02-17 PROCEDURE — 96374 THER/PROPH/DIAG INJ IV PUSH: CPT

## 2020-02-17 PROCEDURE — 36415 COLL VENOUS BLD VENIPUNCTURE: CPT

## 2020-02-17 PROCEDURE — 96375 TX/PRO/DX INJ NEW DRUG ADDON: CPT

## 2020-02-17 PROCEDURE — 71046 X-RAY EXAM CHEST 2 VIEWS: CPT

## 2020-02-17 PROCEDURE — 70450 CT HEAD/BRAIN W/O DYE: CPT

## 2020-02-17 PROCEDURE — 85025 COMPLETE CBC W/AUTO DIFF WBC: CPT

## 2020-02-17 PROCEDURE — 85610 PROTHROMBIN TIME: CPT

## 2020-02-17 PROCEDURE — 85379 FIBRIN DEGRADATION QUANT: CPT

## 2020-02-17 PROCEDURE — 87804 INFLUENZA ASSAY W/OPTIC: CPT

## 2020-02-17 PROCEDURE — 81025 URINE PREGNANCY TEST: CPT

## 2020-02-17 PROCEDURE — 82553 CREATINE MB FRACTION: CPT

## 2020-02-17 PROCEDURE — 93005 ELECTROCARDIOGRAM TRACING: CPT

## 2020-02-17 PROCEDURE — 80053 COMPREHEN METABOLIC PANEL: CPT

## 2020-02-17 PROCEDURE — 81001 URINALYSIS AUTO W/SCOPE: CPT

## 2020-02-17 PROCEDURE — 87086 URINE CULTURE/COLONY COUNT: CPT

## 2020-02-17 PROCEDURE — 83735 ASSAY OF MAGNESIUM: CPT

## 2020-02-17 PROCEDURE — 85730 THROMBOPLASTIN TIME PARTIAL: CPT

## 2020-02-17 NOTE — RAD
CT ANGIOGRAPHY CHEST 

 

INDICATION:  Chest pain. 

 

Comparison: None.

 

TECHNIQUE: Following the uneventful administration of intravenous 

contrast, 75 cc Omnipaque 350, axial CT sections were obtained through the

lungs and upper abdomen. Multiplanar reconstructions and MIP images were 

obtained.

 

PQRS compliance statement:

 

One or more of the following individualized dose reduction techniques were

utilized for this examination:

1. Automated exposure control

2. Adjustment of the mA and/or kV according to patient size

3. Use of iterative reconstruction technique

 

FINDINGS: 

 

Pulmonary arteries: No evidence of pulmonary thrombolic disease

 

Lungs and Airways: No pulmonary mass or consolidation. No abnormality of 

the central airways. 

 

Pleura: The pleural spaces are normal.

 

Heart and Mediastinum: The visualized thyroid is normal in size and 

attenuation. No axillary or supraclavicular lymphadenopathy. No 

mediastinal, hilar or retrocrural lymphadenopathy. The heart and 

pericardium are within normal limits. The great vessels of the thorax are 

normal. Residual thymic tissue.

 

Abdomen: Limited images through the upper abdomen show no abnormality of 

the visualized organs.

 

Bones and Soft Tissues: Right convex thoracic curvature.

 

IMPRESSION:  

1. No evidence of pulmonary thromboembolic disease.

2. No pulmonary mass or consolidation.

 

Electronically signed by: Mik Carcamo MD (2/17/2020 8:31 PM) [id number]

## 2020-02-17 NOTE — PHYS DOC
Past Medical History


Past Medical History:  Cancer, Other


Additional Past Medical Histor:  Ovarian CA, Scoliosis, brain tumor


Past Surgical History:  , Oophorectomy, Other


Additional Past Surgical Histo:  Hernia repair, R OVARY


Smoking Status:  Never Smoker


Alcohol Use:  Occasionally


Drug Use:  None





Adult General


Chief Complaint


Chief Complaint:  HEADACHE





HPI


HPI





Patient is a 34  year old female, brought to the emergency department by EMS, 

with complaints of right-sided chest pain that began today and body aches, 

fatigue, nausea, and a headache with blurry vision, photosensitivity, and 

lightheadedness for the last 3 days. Patient denies any  vomiting, diarrhea, 

abdominal pain, shortness breath, wheezing, cough, or fever. She states that she

has a history of headaches and that this is similar to her previous headaches. 

She denies any palpitations, numbness, tingling, or weakness. The patient states

that the chest pain increases with a deep breath and is relieved by nothing. 

Patient currently rates her pain 9 out of 10 on pain scale she denies any 

alleviating factors.





Review of Systems


Review of Systems


All other ROS is negative unless otherwise noted in HPI.





Current Medications


Current Medications





Current Medications








 Medications


  (Trade)  Dose


 Ordered  Sig/Axel  Start Time


 Stop Time Status Last Admin


Dose Admin


 


 Dexamethasone


 Sodium Phosphate


  (Decadron)  10 mg  1X  ONCE  20 18:15


 20 18:16 DC 20 18:15


10 MG


 


 Diphenhydramine


 HCl


  (Benadryl)  25 mg  1X  ONCE  20 18:15


 20 18:16 DC 20 18:15


25 MG


 


 Info


  (CONTRAST GIVEN


 -- Rx MONITORING)  1 each  PRN DAILY  PRN  20 20:00


 20 19:59   





 


 Iohexol


  (Omnipaque 350


 Mg/ml)  5 ml  1X  ONCE  20 19:45


 20 19:48 DC 20 20:12


5 ML


 


 Ketorolac


 Tromethamine


  (Toradol 15mg


 Vial)  15 mg  1X  ONCE  20 18:15


 20 18:16 DC 20 18:15


15 MG


 


 Prochlorperazine


 Edisylate


  (Compazine)  10 mg  1X  ONCE  20 18:15


 20 18:16 DC 20 18:15


10 MG


 


 Sodium Chloride  1,000 ml @ 


 1,000 mls/hr  1X  ONCE  20 18:15


 20 19:14 DC 20 18:15


1,000 MLS/HR











Allergies


Allergies





Allergies








Coded Allergies Type Severity Reaction Last Updated Verified


 


  No Known Drug Allergies    3/17/14 No











Physical Exam


Physical Exam


See Above


Constitutional: Well developed, well nourished, no acute distress, non-toxic 

appearance. []


HENT: Normocephalic, atraumatic, bilateral external ears normal, nose normal. []


Eyes: PERRLA, EOMI, conjunctiva normal, no discharge. [] 


Neck: Normal range of motion, no tenderness, supple, no stridor. [] 


Cardiovascular:Heart rate regular rhythm, no murmur []


Lungs & Thorax:  Bilateral breath sounds clear to auscultation, Respirations 

even and unlabored, no retractions, no respiratory distress []


Abdomen: Bowel sounds normal, soft, no tenderness


Skin: Warm, dry, no erythema, no rash. [] 


Back: No tenderness


Extremities: No cyanosis, ROM intact, no edema. [] 


Neurologic: Alert and oriented X 3,  no focal deficits noted. []


Psychologic: Affect normal, judgement normal, mood normal. []





Current Patient Data


Vital Signs





                                   Vital Signs








  Date Time  Temp Pulse Resp B/P (MAP) Pulse Ox O2 Delivery O2 Flow Rate FiO2


 


20 18:20 98.3 106 18 112/67 (82) 98 Room Air  





 98.3       








Lab Values





                                Laboratory Tests








Test


 20


18:30 20


18:36 20


18:41 20


19:20


 


Urine Collection Type Unknown     


 


Urine Color Yellow     


 


Urine Clarity Clear     


 


Urine pH 6.0     


 


Urine Specific Gravity >=1.030     


 


Urine Protein


 Negative mg/dL


(NEG-TRACE) 


 


 





 


Urine Glucose (UA)


 Negative mg/dL


(NEG) 


 


 





 


Urine Ketones (Stick)


 Trace mg/dL


(NEG) 


 


 





 


Urine Blood


 Negative (NEG)


 


 


 





 


Urine Nitrite


 Negative (NEG)


 


 


 





 


Urine Bilirubin Small (NEG)     


 


Urine Urobilinogen Dipstick


 1.0 mg/dL (0.2


mg/dL) 


 


 





 


Urine Leukocyte Esterase


 Negative (NEG)


 


 


 





 


Urine RBC


 3-5 /HPF (0-2)


 


 


 





 


Urine WBC


 1-4 /HPF (0-4)


 


 


 





 


Urine Squamous Epithelial


Cells Many /LPF  


 


 


 





 


Urine Bacteria


 Moderate /HPF


(0-FEW) 


 


 





 


Urine Mucus Marked /LPF     


 


White Blood Count


 


 4.9 x10^3/uL


(4.0-11.0) 


 





 


Red Blood Count


 


 3.31 x10^6/uL


(3.50-5.40)  L 


 





 


Hemoglobin


 


 10.8 g/dL


(12.0-15.5)  L 


 





 


Hematocrit


 


 32.6 %


(36.0-47.0)  L 


 





 


Mean Corpuscular Volume


 


 99 fL ()


 


 





 


Mean Corpuscular Hemoglobin  33 pg (25-35)    


 


Mean Corpuscular Hemoglobin


Concent 


 33 g/dL


(31-37) 


 





 


Red Cell Distribution Width


 


 13.5 %


(11.5-14.5) 


 





 


Platelet Count


 


 350 x10^3/uL


(140-400) 


 





 


Neutrophils (%) (Auto)  38 % (31-73)    


 


Lymphocytes (%) (Auto)  51 % (24-48)  H  


 


Monocytes (%) (Auto)  9 % (0-9)    


 


Eosinophils (%) (Auto)  2 % (0-3)    


 


Basophils (%) (Auto)  1 % (0-3)    


 


Neutrophils # (Auto)


 


 1.9 x10^3/uL


(1.8-7.7) 


 





 


Lymphocytes # (Auto)


 


 2.5 x10^3/uL


(1.0-4.8) 


 





 


Monocytes # (Auto)


 


 0.4 x10^3/uL


(0.0-1.1) 


 





 


Eosinophils # (Auto)


 


 0.1 x10^3/uL


(0.0-0.7) 


 





 


Basophils # (Auto)


 


 0.0 x10^3/uL


(0.0-0.2) 


 





 


Prothrombin Time


 


 13.2 SEC


(11.7-14.0) 


 





 


Prothrombin Time INR  1.0 (0.8-1.1)    


 


Activated Partial


Thromboplast Time 


 29 SEC (24-38)


 


 





 


D-Dimer (Makenzie)


 


 0.53 ug/mlFEU


(0.00-0.50)  H 


 





 


Sodium Level


 


 141 mmol/L


(136-145) 


 





 


Potassium Level


 


 3.9 mmol/L


(3.5-5.1) 


 





 


Chloride Level


 


 105 mmol/L


() 


 





 


Carbon Dioxide Level


 


 25 mmol/L


(21-32) 


 





 


Anion Gap  11 (6-14)    


 


Blood Urea Nitrogen


 


 24 mg/dL


(7-20)  H 


 





 


Creatinine


 


 0.9 mg/dL


(0.6-1.0) 


 





 


Estimated GFR


(Cockcroft-Gault) 


 86.7  


 


 





 


BUN/Creatinine Ratio  27 (6-20)  H  


 


Glucose Level


 


 87 mg/dL


(70-99) 


 





 


Calcium Level


 


 8.7 mg/dL


(8.5-10.1) 


 





 


Magnesium Level


 


 1.9 mg/dL


(1.8-2.4) 


 





 


Total Bilirubin


 


 0.2 mg/dL


(0.2-1.0) 


 





 


Aspartate Amino Transferase


(AST) 


 15 U/L (15-37)


 


 





 


Alanine Aminotransferase (ALT)


 


 16 U/L (14-59)


 


 





 


Alkaline Phosphatase


 


 66 U/L


() 


 





 


Creatine Kinase


 


 48 U/L


() 


 





 


Creatine Kinase MB (Mass)


 


 < 0.5 ng/mL


(0.0-3.6) 


 





 


Creatine Kinase MB Relative


Index 


  % (0-4)  


 


 





 


Troponin I Quantitative


 


 < 0.017 ng/mL


(0.000-0.055) 


 





 


Total Protein


 


 8.7 g/dL


(6.4-8.2)  H 


 





 


Albumin


 


 3.7 g/dL


(3.4-5.0) 


 





 


Albumin/Globulin Ratio


 


 0.7 (1.0-1.7)


L 


 





 


POC Urine HCG, Qualitative


 


 


 Hcg negative


(Negative) 





 


Influenza Type A Antigen


 


 


 


 Negative


(NEGATIVE)


 


Influenza Type B Antigen


 


 


 


 Negative


(NEGATIVE)





                                Laboratory Tests


20 18:36








                                Laboratory Tests


20 18:36











EKG


EKG


[]





Radiology/Procedures


Radiology/Procedures


PROCEDURE: CT HEAD WO CONTRAST





CT HEAD WO CONTRAST


 


History: Headache, history of brain tumor


 


Comparison: 2017


 


Technique: Noncontrast CT imaging was performed of the head.  Exposure: 


One or more of the following individualized dose reduction techniques were


utilized for this examination:  1. Automated exposure control  2. 


Adjustment of the mA and/or kV according to patient size  3. Use of 


iterative reconstruction technique.


 


Findings: No acute extra-axial or parenchymal hemorrhage is identified. 


There is no significant intra-axial mass effect, midline shift, or 


extra-axial fluid collection. The gray-white differentiation of the major 


vascular territories is preserved.  The ventricles, sulci, and cisterns 


are within normal limits in size and configuration.  There is again cavum 


septum pellucidum. The mastoid air cells and the visualized paranasal 


sinuses are aerated.  No acute calvarial abnormality is identified. There 


is again degree of cerebellar tonsillar ectopia.


 


Impression:


1. No acute intracranial abnormality is identified.  There is again degree


of cerebellar tonsillar ectopia.





PROCEDURE: CT ANGIOGRAPHY CHEST





CT ANGIOGRAPHY CHEST 


 


INDICATION:  Chest pain. 


 


Comparison: None.


 


TECHNIQUE: Following the uneventful administration of intravenous 


contrast, 75 cc Omnipaque 350, axial CT sections were obtained through the


lungs and upper abdomen. Multiplanar reconstructions and MIP images were 


obtained.


 


PQRS compliance statement:


 


One or more of the following individualized dose reduction techniques were


utilized for this examination:


1. Automated exposure control


2. Adjustment of the mA and/or kV according to patient size


3. Use of iterative reconstruction technique


 


FINDINGS: 


 


Pulmonary arteries: No evidence of pulmonary thrombolic disease


 


Lungs and Airways: No pulmonary mass or consolidation. No abnormality of 


the central airways. 


 


Pleura: The pleural spaces are normal.


 


Heart and Mediastinum: The visualized thyroid is normal in size and 


attenuation. No axillary or supraclavicular lymphadenopathy. No 


mediastinal, hilar or retrocrural lymphadenopathy. The heart and 


pericardium are within normal limits. The great vessels of the thorax are 


normal. Residual thymic tissue.


 


Abdomen: Limited images through the upper abdomen show no abnormality of 


the visualized organs.


 


Bones and Soft Tissues: Right convex thoracic curvature.


 


IMPRESSION:  


1. No evidence of pulmonary thromboembolic disease.


2. No pulmonary mass or consolidation.


 





PROCEDURE: CHEST PA & LATERAL





EXAM: PA and Lateral Views of the Chest


 


DATE: 2020 6:05 PM


 


INDICATION: Chest pain


 


COMPARISON: No Prior


 


FINDINGS:


The heart is not enlarged.


 


Evaluation of the mediastinum and roseann limited given thoracal lumbar 


scoliosis and patient rotation. Compared to 2018 there are airspace 


opacities in the right infrahilar lung.


 


No pleural effusion or pneumothorax.


 


Thoracolumbar scoliosis.


 


IMPRESSION:


1.  Apparent opacities in the medial right infrahilar lung may represent 


developing consolidative process or atelectasis.


 []





Course & Med Decision Making


Course & Med Decision Making


Pertinent Labs and Imaging studies reviewed. (See chart for details)


Pt is a 34-year-old female presented to emergency room with complaints of right-

sided chest pain that began today, and generalized body aches, fatigue, 

headache, and nausea for the last 3 days. She was given 1 L of normal saline, 25

 mg of IV Benadryl, 10 mg of IV Decadron, and 10 mg of IV Compazine. She 

reported relief of her headache and pain after these medications. CBC revealed 

hemoglobin of 10.8, hematocrit of 32.6 otherwise unremarkable; a T9 are within 

normal limits, d-dimer slightly elevated at 0.53; CMP revealed BUS and of 24 and

 a BUN/creatinine ratio of 27 troponin and CK markers were within normal limits,

 electrolytes unremarkable; UA is likely contaminated with many squamous cells, 

3-5 red blood cells, 1-4 white blood cells, patient was asymptomatic. Patient 

was encouraged to take Tylenol or ibuprofen at home as needed for relief of 

pain. Follow-up with primary care doctor if symptoms persist, return to ER 

symptoms worsen.


[]





Dragon Disclaimer


Dragon Disclaimer


This electronic medical record was generated, in whole or in part, using a voice

 recognition dictation system.





Departure


Departure


Impression:  


   Primary Impression:  


   Headache


   Additional Impression:  


   Upper respiratory infection


Disposition:  01 HOME, SELF-CARE


Condition:  STABLE


Referrals:  


DANNY HOLM MD (PCP)


Patient Instructions:  General Headache Without Cause, Easy-to-Read, Upper 

Respiratory Infection, Adult, Easy-to-Read





Additional Instructions:  


Recommend use of a Cool mist humidifier in room at bedtime. Alternate Tylenol or

 ibuprofen as needed for pain/fever. Increase clear fluids. Avoid airway 

triggers such as smoke, fragrance, dust, and pollen. May take over-the-counter 

cough suppressants as needed. Follow-up with your primary care doctor if 

symptoms persist, return to the ER if symptoms worsen.





Problem Qualifiers








   Primary Impression:  


   Headache


   Headache type:  unspecified  Headache chronicity pattern:  acute headache  

   Intractability:  not intractable  Qualified Codes:  R51 - Headache


   Additional Impression:  


   Upper respiratory infection


   URI type:  unspecified URI  Qualified Codes:  J06.9 - Acute upper respiratory

    infection, unspecified








JOCELIN IZAGUIRRE APRN       2020 18:12

## 2020-02-17 NOTE — RAD
EXAM: PA and Lateral Views of the Chest

 

DATE: 2/17/2020 6:05 PM

 

INDICATION: Chest pain

 

COMPARISON: No Prior

 

FINDINGS:

The heart is not enlarged.

 

Evaluation of the mediastinum and roseann limited given thoracal lumbar 

scoliosis and patient rotation. Compared to 7/19/2018 there are airspace 

opacities in the right infrahilar lung.

 

No pleural effusion or pneumothorax.

 

Thoracolumbar scoliosis.

 

IMPRESSION:

1.  Apparent opacities in the medial right infrahilar lung may represent 

developing consolidative process or atelectasis.

 

Electronically signed by: Prosper Goyal MD (2/17/2020 8:37 PM) 

[id number]

## 2020-02-18 NOTE — EKG
[facility]

              [address] [place] [address]

Test Date:    2020               Test Time:    18:25:09

Pat Name:     EDGARDO DAVID        Department:   

Patient ID:   [id number]           Room:          

Gender:       F                        Technician:   

:          1985               Requested By: JOCELIN IZAGUIRRE

Order Number: [id number]           Reading MD:     

                                 Measurements

Intervals                              Axis          

Rate:         102                      P:            26

TX:           120                      QRS:          29

QRSD:         96                       T:            -7

QT:           340                                    

QTc:          447                                    

                           Interpretive Statements

SINUS TACHYCARDIA

T ABNORMALITY IN INFERIOR LEADS

ABNORMAL ECG

RI6.01

No previous ECG available for comparison

## 2020-07-14 ENCOUNTER — HOSPITAL ENCOUNTER (EMERGENCY)
Dept: HOSPITAL 61 - ER | Age: 35
Discharge: HOME | End: 2020-07-14
Payer: SELF-PAY

## 2020-07-14 VITALS
DIASTOLIC BLOOD PRESSURE: 62 MMHG | SYSTOLIC BLOOD PRESSURE: 118 MMHG | DIASTOLIC BLOOD PRESSURE: 62 MMHG | DIASTOLIC BLOOD PRESSURE: 62 MMHG | SYSTOLIC BLOOD PRESSURE: 118 MMHG | DIASTOLIC BLOOD PRESSURE: 62 MMHG | SYSTOLIC BLOOD PRESSURE: 118 MMHG | DIASTOLIC BLOOD PRESSURE: 62 MMHG | SYSTOLIC BLOOD PRESSURE: 118 MMHG | SYSTOLIC BLOOD PRESSURE: 118 MMHG

## 2020-07-14 VITALS — HEIGHT: 60 IN | WEIGHT: 82.23 LBS | BODY MASS INDEX: 16.14 KG/M2

## 2020-07-14 DIAGNOSIS — R09.81: ICD-10-CM

## 2020-07-14 DIAGNOSIS — M41.9: ICD-10-CM

## 2020-07-14 DIAGNOSIS — Z20.828: ICD-10-CM

## 2020-07-14 DIAGNOSIS — Z90.89: ICD-10-CM

## 2020-07-14 DIAGNOSIS — R05: Primary | ICD-10-CM

## 2020-07-14 DIAGNOSIS — Z98.890: ICD-10-CM

## 2020-07-14 DIAGNOSIS — R07.89: ICD-10-CM

## 2020-07-14 PROCEDURE — 96374 THER/PROPH/DIAG INJ IV PUSH: CPT

## 2020-07-14 PROCEDURE — 99285 EMERGENCY DEPT VISIT HI MDM: CPT

## 2020-07-14 PROCEDURE — 71045 X-RAY EXAM CHEST 1 VIEW: CPT

## 2020-07-14 NOTE — PHYS DOC
Past Medical History


Past Medical History:  Cancer, Other


Additional Past Medical Histor:  Ovarian CA, Scoliosis, brain tumor


Past Surgical History:  , Oophorectomy, Other


Additional Past Surgical Histo:  Hernia repair, R OVARY


Smoking Status:  Never Smoker


Alcohol Use:  Occasionally


Drug Use:  None





General Adult


EDM:


Chief Complaint:  CHEST WALL PAIN





HPI:


HPI:





Patient is a 35  year old female presents with a chief complaint of right-sided 

chest pain associated with cough.  Patient states she has had symptoms for the 

last several days progressive becoming worse.  Patient also noted to have nasal 

drainage.  On exam patient's lungs are clear.  Patient's chest wall tender to 

palpation.  Patient denies any fevers.





Review of Systems:


Review of Systems:


Constitutional:   Denies fever or chills. []


Eyes:   Denies change in visual acuity. []


HENT:   Positive congestion


Respiratory:   Positive cough


Cardiovascular: Positive chest pain


GI:   Denies abdominal pain, nausea, vomiting, bloody stools or diarrhea. [] 


:  Denies dysuria. [] 


Musculoskeletal:   Denies back pain or joint pain. [] 


Integument:   Denies rash. [] 


Neurologic:   Denies headache, focal weakness or sensory changes. [] 


Endocrine:   Denies polyuria or polydipsia. [] 


Lymphatic:  Denies swollen glands. [] 


Psychiatric:  Denies depression or anxiety. []





Heart Score:


HEART Score for Chest Pain:  








HEART Score for Chest Pain Response (Comments) Value


 


History Slighlty/Non-Suspicious 0


 


ECG Normal 0


 


Age < 45 0


 


Risk Factors No Risk Factors 0


 


Troponin < Normal Limit 0


 


Total  0








Risk Factors:


Risk Factors:  DM, Current or recent (<one month) smoker, HTN, HLP, family 

history of CAD, obesity.


Risk Scores:


Score 0 - 3:  2.5% MACE over next 6 weeks - Discharge Home


Score 4 - 6:  20.3% MACE over next 6 weeks - Admit for Clinical Observation


Score 7 - 10:  72.7% MACE over next 6 weeks - Early Invasive Strategies





Current Medications:





Current Medications








 Medications


  (Trade)  Dose


 Ordered  Sig/Axel  Start Time


 Stop Time Status Last Admin


Dose Admin


 


 Ketorolac


 Tromethamine


  (Toradol 30mg


 Vial)  30 mg  1X  ONCE  20 20:15


 20 20:16 DC 20 20:26


30 MG











Allergies:


Allergies:





Allergies








Coded Allergies Type Severity Reaction Last Updated Verified


 


  No Known Drug Allergies    3/17/14 No











Physical Exam:


PE:





Constitutional: Well developed, well nourished, no acute distress, non-toxic 

appearance. []


HENT: Normocephalic, atraumatic, bilateral external ears normal, oropharynx 

moist, no oral exudates, nose normal. []


Eyes: PERRLA, EOMI, conjunctiva normal, no discharge. [] 


Neck: Normal range of motion, no tenderness, supple, no stridor. [] 


Cardiovascular:Heart rate regular rhythm, no murmur [] chest wall tenderness to 

palpation pain reproducible


Lungs & Thorax:  Bilateral breath sounds clear to auscultation []


Abdomen: Bowel sounds normal, soft, no tenderness, no masses, no pulsatile wilbert

s. [] 


Skin: Warm, dry, no erythema, no rash. [] 


Back: No tenderness, no CVA tenderness. [] 


Extremities: No tenderness, no cyanosis, no clubbing, ROM intact, no edema. [] 


Neurologic: Alert and oriented X 3, normal motor function, normal sensory 

function, no focal deficits noted. []


Psychologic: Affect normal, judgement normal, mood normal. []





Current Patient Data:


Vital Signs:





                                   Vital Signs








  Date Time  Temp Pulse Resp B/P (MAP) Pulse Ox O2 Delivery O2 Flow Rate FiO2


 


20 19:57 98.2 102 17 102/72 (82) 99   





 98.2       


 


20 19:39      Room Air  











EKG:


EKG:


[]





Radiology/Procedures:


Radiology/Procedures:


[]


Impression:


 


FINDINGS:


 


Single view of chest obtained.


Scoliotic curvature of the spine is again seen. Cardiac silhouette is 


similar to prior. No definite new region of consolidation or edema.


 


 


IMPRESSION:


 


*  No focal airspace consolidation or edema.


 


Electronically signed by: Clifton Shah MD (2020 8:28 PM) 


[id number]





Course & Med Decision Making:


Course & Med Decision Making


Pertinent Labs and Imaging studies reviewed. (See chart for details)





[] Patient with upper respiratory symptoms.  Patient's right chest wall is 

tender to palpation.  Suspect chest discomfort musculoskeletal.  Patient had 

coven swab and it is pending.  Patient given COVID discharge precautions.  

Patient will be prescribed Tylenol 3 and Zithromax.





Dragon Disclaimer:


Dragon Disclaimer:


This electronic medical record was generated, in whole or in part, using a voice

 recognition dictation system.





Departure


Departure


Impression:  


   Primary Impression:  


   Cough


   Additional Impressions:  


   Chest pain


   Person under investigation for COVID-19


Disposition:  01 HOME, SELF-CARE


Condition:  STABLE


Referrals:  


CONRADO CHICAS (PCP)


Patient Instructions:  Chest Pain (Nonspecific), Cough, Adult





Additional Instructions:  


You have been tested for or diagnosed with COVID-19. It is an infection caused 

by a new type 


of coronavirus. COVID-19 will cause cold-like or mild flu symptoms in most. It 

can cause 


more severe symptoms like problems breathing in some.





There is no treatment for COVID-19. The body will clear the infection over time.

 Self-care 


will help to ease discomfort.





Steps to Take:


Self-Care


Rest as needed. Healthy habits may help you feel better. Steps include:





Choose healthy foods including fruits and vegetables. Drink water throughout the

 day.


Get plenty of sleep each night.


If you smoke, try to quit. It may ease breathing.


Avoid alcohol.


Keep Others Healthy


The virus can spread to others. Droplets are released every time you sneeze or 

cough. The 


droplets can get into the mouth, nose, or eyes of people near you and lead to 

infection. To 


lower the chances of spreading COVID-19 to others:





Stay at home until your doctor has said it is safe to leave. If you tested 

positive this 


will mean staying isolated until both of the following are true:





At least 7 days have passed since the start of illness.


You are free of fever for at least 72 hours without the use of medicine.


During this time:





 - Avoid public areas, events, or transportation. Do not return to work or 

school until your 


doctor has said it is safe to do so.


 - Call ahead if you need to go to a medical center. Let them know you may have 

COVID-19. It 


will help them guide you where to go. They may also ask you to wear a facemask 

when you come 


to the office.


 - If you call for emergency medical services, let them know you may have COVID-

19.


While at home:





 - Try to avoid close contact with others. Stay about 6 feet away.


 - If possible, spend most of your time in a separate room from others.


 - Use a face mask if you will be in close contact with others such as sharing a

 room or 


vehicle.


 - Have someone wipe down common surfaces in the home. Use household  

every day on 


areas like doorknobs, counters, or sinks.


 - Cough or sneeze into a tissue. Throw the tissue away right after use. If a 

tissue is not 


available, cough or sneeze into your elbow.


 - Wash your hands often. Wash them after sneezing or coughing. Use soap and 

water and wash 


for at least 20 seconds. Alcohol based hand  can be used if soap and 

water is not 


available.


 - Do not prepare food for others. Avoid sharing personal items like forks, 

spoons, or 


toothbrushes.


 - Avoid close contact with pets while you are sick. There is no evidence of the

 virus 


passing to pets. This is a safety step until more is known about this virus.


Isolation can be frustrating. Social interaction can help. Keep in touch with 

friends and 


family through phone and tech options. You can still interact with others in you

r home, just 


keep a safe distance of about 6 feet.





Follow-up:


Your doctors office will check in with you to see if there are any changes in 

your health. 


You may be asked to keep track of symptoms to share with them. They will also 

let you know 


when you are clear to be in public again.





Problems to Look Out For:


Contact your doctor if your recovery is not going as you expect. Get emergency 

care if you 


have problems such as:





 - Trouble breathing


 - Nonstop chest pain or pressure


 - Changes in awareness, confusion, or problems waking


 - Lips or face have bluish color


 - Worsening of symptoms


If you think you have an emergency, call for emergency medical services right 

away.





[facility]


Scripts


Acetaminophen With Codeine (TYLENOL WITH CODEINE #3 TABLET) 1 Each Tablet


1 TAB PO PRN Q6HRS PRN for pain MDD 4 Tablet(s) for 7 Days, #20 TAB 0 Refills


   Prov: WADE MCCLOUD I DO         20 


Azithromycin (ZITHROMAX) 250 Mg Tablet


1 PKG PO UD, #6 TAB


   Prov: WADE MCCLOUD I DO         20





Justicifation of Admission Dx:


Justifications for Admission:


Justification of Admission Dx:  N/A











WADE MCCLOUD I DO            2020 21:05

## 2020-07-14 NOTE — RAD
INDICATION: Reason: COUGH / Spl. Instructions:  / History: 

 

COMPARISON: February 2020

 

FINDINGS:

 

Single view of chest obtained.

Scoliotic curvature of the spine is again seen. Cardiac silhouette is 

similar to prior. No definite new region of consolidation or edema.

 

 

IMPRESSION:

 

*  No focal airspace consolidation or edema.

 

Electronically signed by: Clifton Shah MD (7/14/2020 8:28 PM) 

[id number]

## 2021-03-04 ENCOUNTER — HOSPITAL ENCOUNTER (OUTPATIENT)
Dept: HOSPITAL 61 - SPEC | Age: 36
End: 2021-03-04
Attending: OBSTETRICS & GYNECOLOGY
Payer: COMMERCIAL

## 2021-03-04 DIAGNOSIS — N73.0: Primary | ICD-10-CM

## 2021-10-29 ENCOUNTER — HOSPITAL ENCOUNTER (OUTPATIENT)
Dept: HOSPITAL 61 - US | Age: 36
End: 2021-10-29
Attending: NURSE PRACTITIONER
Payer: COMMERCIAL

## 2021-10-29 DIAGNOSIS — N83.291: Primary | ICD-10-CM

## 2021-10-29 DIAGNOSIS — N92.0: ICD-10-CM

## 2021-10-29 PROCEDURE — 76856 US EXAM PELVIC COMPLETE: CPT

## 2021-10-29 NOTE — RAD
EXAM: Pelvic sonogram.



HISTORY: Menorrhagia.



TECHNIQUE: Sonographic imaging of the pelvis was performed.



COMPARISON: None.



FINDINGS: The uterus measures 11.0 x 4.7 x 2.7 cm. The endometrial stripe measures less than 2 mm in 
thickness. The ovaries are normal in size and demonstrate normal blood flow. There is a 3.2 cm simple
 right ovarian cyst. There is no pelvic free fluid.



IMPRESSION:

1. 3.2 cm simple right ovarian cyst.

2. Thin endometrial stripe.



Electronically signed by: Keyana Tolentino MD (10/29/2021 4:35 PM) [id number]

## 2021-12-15 ENCOUNTER — HOSPITAL ENCOUNTER (OUTPATIENT)
Dept: HOSPITAL 61 - US | Age: 36
End: 2021-12-15
Attending: OBSTETRICS & GYNECOLOGY
Payer: MEDICARE

## 2021-12-15 DIAGNOSIS — N83.291: Primary | ICD-10-CM

## 2021-12-15 PROCEDURE — 76856 US EXAM PELVIC COMPLETE: CPT

## 2021-12-15 NOTE — RAD
US PELVIS COMPLETE



History: Reason: RT OV CYST / Spl. Instructions:  / History: 



Comparison: October 29, 2021



Technique: Grayscale and color Doppler imaging of the pelvis was performed using transabdominal techn
ique.



Findings:

The uterus measures 10.1 x 3.9 x 2.8 cm.  Uterus has an unremarkable appearance.  The endometrial str
ipe measures 4 mm.



Right ovary measures 3.6 x 3.6 x 3.1 cm. Right ovarian cyst measures 3.1 x 2.7 x 2.5 cm.



Left ovary measures 2.3 x 1.5 x 1.4 cm. Dominant left ovarian follicle measures 1.2 cm.



Normal Doppler flow to the ovaries. No adnexal masses are seen.



IMPRESSION:

1.  Unchanged simple right ovarian cyst.



Electronically signed by: Emil Mohamud DO (12/15/2021 5:21 PM) [id number]

## 2022-01-04 ENCOUNTER — HOSPITAL ENCOUNTER (OUTPATIENT)
Dept: HOSPITAL 61 - LAB | Age: 37
End: 2022-01-04
Attending: OBSTETRICS & GYNECOLOGY
Payer: MEDICARE

## 2022-01-04 DIAGNOSIS — Z20.822: ICD-10-CM

## 2022-01-04 DIAGNOSIS — Z01.812: Primary | ICD-10-CM

## 2022-01-04 PROCEDURE — U0003 INFECTIOUS AGENT DETECTION BY NUCLEIC ACID (DNA OR RNA); SEVERE ACUTE RESPIRATORY SYNDROME CORONAVIRUS 2 (SARS-COV-2) (CORONAVIRUS DISEASE [COVID-19]), AMPLIFIED PROBE TECHNIQUE, MAKING USE OF HIGH THROUGHPUT TECHNOLOGIES AS DESCRIBED BY CMS-2020-01-R: HCPCS

## 2022-01-06 ENCOUNTER — HOSPITAL ENCOUNTER (OUTPATIENT)
Dept: HOSPITAL 61 - SURG | Age: 37
Discharge: HOME | End: 2022-01-06
Attending: OBSTETRICS & GYNECOLOGY
Payer: MEDICARE

## 2022-01-06 VITALS — SYSTOLIC BLOOD PRESSURE: 100 MMHG | DIASTOLIC BLOOD PRESSURE: 65 MMHG

## 2022-01-06 VITALS — DIASTOLIC BLOOD PRESSURE: 99 MMHG | SYSTOLIC BLOOD PRESSURE: 143 MMHG

## 2022-01-06 VITALS — WEIGHT: 96.12 LBS | HEIGHT: 58 IN | BODY MASS INDEX: 20.18 KG/M2

## 2022-01-06 DIAGNOSIS — N83.291: Primary | ICD-10-CM

## 2022-01-06 DIAGNOSIS — Z79.899: ICD-10-CM

## 2022-01-06 DIAGNOSIS — N73.6: ICD-10-CM

## 2022-01-06 DIAGNOSIS — J45.909: ICD-10-CM

## 2022-01-06 DIAGNOSIS — Z98.890: ICD-10-CM

## 2022-01-06 DIAGNOSIS — Z72.89: ICD-10-CM

## 2022-01-06 PROCEDURE — A4209 5+ CC STERILE SYRINGE&NEEDLE: HCPCS

## 2022-01-06 PROCEDURE — 81025 URINE PREGNANCY TEST: CPT

## 2022-01-06 PROCEDURE — A4452 WATERPROOF TAPE: HCPCS

## 2022-01-06 PROCEDURE — A4930 STERILE, GLOVES PER PAIR: HCPCS

## 2022-01-06 PROCEDURE — A6219 GAUZE <= 16 SQ IN W/BORDER: HCPCS

## 2022-01-06 PROCEDURE — 58662 LAPAROSCOPY EXCISE LESIONS: CPT

## 2022-01-06 RX ADMIN — MORPHINE SULFATE PRN MG: 2 INJECTION, SOLUTION INTRAMUSCULAR; INTRAVENOUS at 12:32

## 2022-01-06 RX ADMIN — MORPHINE SULFATE PRN MG: 2 INJECTION, SOLUTION INTRAMUSCULAR; INTRAVENOUS at 12:21

## 2022-01-06 NOTE — OP
DATE OF SURGERY: 01/06/2022

PREOPERATIVE DIAGNOSIS:  Right ovarian cyst.



POSTOPERATIVE DIAGNOSIS:  Right ovarian cyst.



PROCEDURE:  Laparoscopic right ovarian cystectomy.



SURGEON:  Simón Sanchez MD.



FIRST ASSISTANT:  Kenny.



ANESTHESIA:  GETA.



ESTIMATED BLOOD LOSS:  10 mL.



COMPLICATIONS:  None.



FINDINGS:  Pelvic sidewall adhesions, uterine adhesions, right ovarian cyst 4 cm

size, normal fallopian tubes bilaterally, normal left ovary.



SUMMARY:  A 36-year-old female with pelvic pain and documented right ovarian 

cyst on pelvic sonogram that did not resolve, size was 3-4 cm size.  She was 

counseled on the risks, benefits, and expectation of laparoscopic right ovarian 

cystectomy and voiced clear understanding to proceed.



DESCRIPTION OF PROCEDURE:  The patient was taken to surgery suite and placed in 

dorsal lithotomy position, was prepped with Betadine solution for vaginal prep 

and ChloraPrep for abdominal prep.  After adequate anesthesia, bivalve speculum 

was placed vaginally.  Anterior lip of the cervix grasped with single tooth 

tenaculum.  The uterine acorn manipulator was then placed.  The bivalve speculum

was removed.  Attention was now placed on abdomen.



Small transverse skin incision was made just below the umbilicus with a scalpel.

 The Veress needle was then placed through the infraumbilical incision site.  

The abdomen was allowed to insufflate up to 1-1/2 liters CO2 gas.  The Veress 

needle was then removed.  A 5 mm trocar was placed.  Scope was positioned.  

There were multiple omental adhesions to the abdominal wall and pelvic sidewall 

as well as the uterus that was adhered to the anterior abdominal wall.  The left

fallopian tube and ovary appeared normal.  The right fallopian tube appeared 

normal.  The right ovary demonstrated a 4 cm size cyst.  Two additional 

incisions made in the left lower quadrant in which a 5 mm and 8 mm trocar was 

placed with aid of graspers and the EndoShears.  A few of the omental adhesions 

were removed.  The right ovary was grasped.  The ovary was incised with the 

EndoShears.  There was dark colored fluid removed from the ovarian cyst with the

suction .  The portion of the left ovarian cyst wall was excised using 

the EndoShears.  The remaining right ovarian cyst wall was fulgurated with 

cautery and was hemostatic.  Suction irrigation was utilized to verify good 

hemostasis.  Additional omental adhesions were removed with the aid of the 

EnSeal device.  A small amount of normal saline was left in posterior 

cul-de-sac.  The trocars were then removed under direct visualization.  The 

abdomen was allowed to deflate as much as possible along with mechanical 

manipulation.  The 3 skin incisions were reapproximated using 4-0 Vicryl suture 

in subcuticular manner.  A 0.25% Marcaine with epinephrine was injected at each 

incision site.  Uterine acorn manipulator and single tooth tenaculum were 

removed.  The patient tolerated the procedure well and was taken to recovery 

room in stable condition.  Sponge and needle count correct x 3.







WAYNE/YANCY

DR: Ashly   DD: 01/06/2022 11:54

DT: 01/06/2022 12:09   TID: [id number]

## 2022-01-06 NOTE — PDOC4
BRIEF OPERATIVE NOTE


Date:  Jan 6, 2022


Pre-Op Diagnosis


ROV Cyst


Post-Op Diagnosis


Same


Procedure Performed


[facility] ROV Cystectomy


Surgeon


Dr. Sanchez


Assistant


First Assist: Kenny


Anesthesia Type:  General


Blood Loss


10 ml


Specimens Obtained


ROV cyst wall


Findings


Pelvic sidewall adhesions, uterine adhesions, ROV cyst 4 cm size; nml fallopian 

tubes radha., nml LOV


Complications


none


Operative Note


see dictation











DELILAH SANCHEZ Jr, MD           Jan 6, 2022 11:55

## 2022-01-06 NOTE — DISCH
DISCHARGE INSTRUCTIONS


Condition on Discharge


Condition on Discharge:  Stable





Activity After Discharge


Activity Instructions for Disc:  Activity as tolerated


Lifting Instructions after Dis:  No heavy lifting


Driving Instructions after Dis:  Do not drive today





Diet after Discharge


Diet after Discharge:  Regular





Contacting the DRArgelia after DC


Call your doctor for:  Concerns you may have





Follow-Up


Follow up with:  Dr. Sanchez in 1 week











DELILAH SANCHEZ Jr, MD           Jan 6, 2022 11:57

## 2022-01-07 NOTE — PATHOLOGY
[facility] Accession Number: [id number]

.                                                                01

Material submitted:                                        .

ovary - RIGHT OVARIAN CYST. Modifiers: right

.                                                                01

Clinical history:                                          .

LAP R OVARIAN CYSTECTOMY

.                                                                02

**********************************************************************

Diagnosis:

Ovarian tissue, laparoscopic right ovarian cystectomy:

- Cystic endometriosis.

(JPM:nicolas; 01/07/2022)

S  01/07/2022  1543 Local

**********************************************************************

.                                                                02

Comment:

There is no evidence of malignancy.

(JPM:nicolas; 01/07/2022)

.                                                                02

Electronically signed:                                     .

Tee Orellana MD, Pathologist

NPI- [id number]

.                                                                01

Gross description:                                         .

The specimen is received in formalin, labeled "Criselda Dubon, right

ovarian cyst".  Received is a segment of pink-gray fibromembranous tissue

measuring 2.0 x 1.8 x 0.3 cm in greatest dimensions.  The specimen is

serially sectioned and entirely submitted in cassettes A1 and A2.

([facility]; 1/6/2022)

QAC/QA  01/07/2022  1351 Local

.                                                                02

Pathologist provided ICD-10:

N83.201

.                                                                02

CPT                                                        .

258631

Specimen Comment: A courtesy copy of this report has been sent to [phone], [phone]

Specimen Comment: [id number]

Specimen Comment: Report sent to  / DR CHICAS

***Performed at:  01

   [facility]

   [address], [place]  [id number]

   MD Palomo Madsen MD Phone:  [phone]

***Performed at:  02

   [facility]

   [address] [place], [place]  [id number]

   MD Tee Orellana MD Phone:  [phone]

## 2022-04-26 ENCOUNTER — HOSPITAL ENCOUNTER (INPATIENT)
Dept: HOSPITAL 61 - ER | Age: 37
LOS: 1 days | Discharge: HOME | DRG: 916 | End: 2022-04-27
Attending: INTERNAL MEDICINE | Admitting: INTERNAL MEDICINE
Payer: MEDICARE

## 2022-04-26 VITALS — DIASTOLIC BLOOD PRESSURE: 99 MMHG | SYSTOLIC BLOOD PRESSURE: 146 MMHG

## 2022-04-26 VITALS — SYSTOLIC BLOOD PRESSURE: 118 MMHG | DIASTOLIC BLOOD PRESSURE: 86 MMHG

## 2022-04-26 VITALS — WEIGHT: 92.15 LBS | HEIGHT: 60 IN | BODY MASS INDEX: 18.09 KG/M2

## 2022-04-26 VITALS — SYSTOLIC BLOOD PRESSURE: 130 MMHG | DIASTOLIC BLOOD PRESSURE: 84 MMHG
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
## 2022-04-26 DIAGNOSIS — G89.29: ICD-10-CM

## 2022-04-26 DIAGNOSIS — T78.3XXA: Primary | ICD-10-CM

## 2022-04-26 DIAGNOSIS — M19.90: ICD-10-CM

## 2022-04-26 DIAGNOSIS — Z98.891: ICD-10-CM
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
## 2022-04-26 DIAGNOSIS — Z82.49: ICD-10-CM

## 2022-04-26 DIAGNOSIS — J45.909: ICD-10-CM

## 2022-04-26 DIAGNOSIS — G43.909: ICD-10-CM

## 2022-04-26 DIAGNOSIS — R00.0: ICD-10-CM
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
## 2022-04-26 DIAGNOSIS — Z85.43: ICD-10-CM

## 2022-04-26 LAB
ANION GAP SERPL CALC-SCNC: 10 MMOL/L (ref 6–14)
BASOPHILS # BLD AUTO: 0.1 X10^3/UL (ref 0–0.2)
BASOPHILS NFR BLD: 1 % (ref 0–3)
BUN SERPL-MCNC: 11 MG/DL (ref 7–20)
CALCIUM SERPL-MCNC: 8.7 MG/DL (ref 8.5–10.1)
CHLORIDE SERPL-SCNC: 108 MMOL/L (ref 98–107)
CO2 SERPL-SCNC: 24 MMOL/L (ref 21–32)
CREAT SERPL-MCNC: 0.8 MG/DL (ref 0.6–1)
EOSINOPHIL NFR BLD: 0.2 X10^3/UL (ref 0–0.7)
EOSINOPHIL NFR BLD: 3 % (ref 0–3)
ERYTHROCYTE [DISTWIDTH] IN BLOOD BY AUTOMATED COUNT: 13.2 % (ref 11.5–14.5)
GFR SERPLBLD BASED ON 1.73 SQ M-ARVRAT: 97.7 ML/MIN
GLUCOSE SERPL-MCNC: 81 MG/DL (ref 70–99)
HCT VFR BLD CALC: 37.9 % (ref 36–47)
HGB BLD-MCNC: 12.7 G/DL (ref 12–15.5)
LYMPHOCYTES # BLD: 2.3 X10^3/UL (ref 1–4.8)
LYMPHOCYTES NFR BLD AUTO: 38 % (ref 24–48)
MCH RBC QN AUTO: 30 PG (ref 25–35)
MCHC RBC AUTO-ENTMCNC: 34 G/DL (ref 31–37)
MCV RBC AUTO: 89 FL (ref 79–100)
MONO #: 0.3 X10^3/UL (ref 0–1.1)
MONOCYTES NFR BLD: 5 % (ref 0–9)
NEUT #: 3.3 X10^3/UL (ref 1.8–7.7)
NEUTROPHILS NFR BLD AUTO: 54 % (ref 31–73)
PLATELET # BLD AUTO: 352 X10^3/UL (ref 140–400)
POTASSIUM SERPL-SCNC: 3.9 MMOL/L (ref 3.5–5.1)
RBC # BLD AUTO: 4.25 X10^6/UL (ref 3.5–5.4)
SODIUM SERPL-SCNC: 142 MMOL/L (ref 136–145)
WBC # BLD AUTO: 6 X10^3/UL (ref 4–11)

## 2022-04-26 PROCEDURE — 85025 COMPLETE CBC W/AUTO DIFF WBC: CPT

## 2022-04-26 PROCEDURE — 80048 BASIC METABOLIC PNL TOTAL CA: CPT

## 2022-04-26 PROCEDURE — 86162 COMPLEMENT TOTAL (CH50): CPT

## 2022-04-26 PROCEDURE — 36415 COLL VENOUS BLD VENIPUNCTURE: CPT

## 2022-04-26 PROCEDURE — 96374 THER/PROPH/DIAG INJ IV PUSH: CPT

## 2022-04-26 PROCEDURE — 96375 TX/PRO/DX INJ NEW DRUG ADDON: CPT

## 2022-04-26 PROCEDURE — 96361 HYDRATE IV INFUSION ADD-ON: CPT

## 2022-04-26 PROCEDURE — G0378 HOSPITAL OBSERVATION PER HR: HCPCS

## 2022-04-26 PROCEDURE — 86160 COMPLEMENT ANTIGEN: CPT

## 2022-04-26 PROCEDURE — G0379 DIRECT REFER HOSPITAL OBSERV: HCPCS

## 2022-04-26 RX ADMIN — CYCLOBENZAPRINE HYDROCHLORIDE SCH MG: 10 TABLET, FILM COATED ORAL at 18:23

## 2022-04-26 RX ADMIN — FAMOTIDINE SCH MG: 10 INJECTION, SOLUTION INTRAVENOUS at 21:22

## 2022-04-26 RX ADMIN — OXYCODONE HYDROCHLORIDE AND ACETAMINOPHEN PRN TAB: 5; 325 TABLET ORAL at 21:25

## 2022-04-26 RX ADMIN — METHYLPREDNISOLONE SODIUM SUCCINATE SCH MG: 40 INJECTION, POWDER, FOR SOLUTION INTRAMUSCULAR; INTRAVENOUS at 21:21

## 2022-04-26 RX ADMIN — METHYLPREDNISOLONE SODIUM SUCCINATE SCH MG: 40 INJECTION, POWDER, FOR SOLUTION INTRAMUSCULAR; INTRAVENOUS at 14:42

## 2022-04-26 NOTE — HP
DATE OF SERVICE: 04/26/2022

ADMIT DATE: 04/26/2022

CHIEF COMPLAINT:  Swollen lips.



HISTORY OF PRESENT ILLNESS:  The patient is a pleasant 37-year-old female who 

has a history of recurrent angioedema.  She is not sure what is causing it.  She

is on multiple medications. Once again, she presents with swollen lips.  It 

appears she has recurrent angioedema.  I discussed the case with ER physician.  

We are going to admit the patient and give her steroids, Benadryl, and Pepcid.



PAST MEDICAL HISTORY:  Possible ovarian cancer, polypharmacy, migraine 

headaches, arthritis, chronic pain, muscle spasms and asthma.



ALLERGIES:  PROBABLY ACE INHIBITORS.  SHE PROBABLY HAS OTHER ALLERGIES, BUT SHE 

IS NOT SURE.



FAMILY HISTORY:  Hypertension.



SOCIAL HISTORY:  She does not drink, smoke or take drugs.



MEDICATIONS:  Reviewed.  She is on ProAir, cyclobenzaprine, oxycodone, Ultram 

and Imitrex.



REVIEW OF SYSTEMS:

GENERAL:  No history of weight change, weakness or fevers.

SKIN:  No bruising, hair changes or rashes.

EYES:  No blurred, double or loss of vision.

NOSE AND THROAT:  No history of nosebleeds, hoarseness or sore throat.

HEART:  No history of palpitations, chest pain or shortness of breath on 

exertion.

LUNGS:  Denies cough, hemoptysis, wheezing or shortness of breath.

GASTROINTESTINAL:  Denies changes in appetite, nausea, vomiting, diarrhea or 

constipation.

GENITOURINARY:  No history of frequency, urgency, hesitancy or nocturia.

NEUROLOGIC:  Denies history of numbness, tingling, tremor or weakness.

PSYCHIATRIC:  No history of panic, anxiety or depression.

ENDOCRINE:  No history of heat or cold intolerance, polyuria or polydipsia.

EXTREMITIES:  Denies muscle weakness, joint pain, pain on walking or stiffness. 

HEENT:  She complains of swollen lips.



PHYSICAL EXAMINATION:

VITALS:  Within normal limits and are stable.

GENERAL:  No apparent distress.  Alert and oriented.

HEENT:  Her lips are quite swollen.

EYES:  Extraocular muscles are intact, pupils are equally round and reactive to 

light and accommodation.

MUSCULOSKELETAL:  Well developed, well nourished, good range of motion.

ENDOCRINE:  No thyromegaly was palpated.

LYMPHATICS:  No cervical chain or axillary nodes were noted.

HEMATOPOIETIC:  No bruising.

NECK:  Supple, no JVD, no thyromegaly was noted.

LUNGS:  Clear to auscultation in all lung fields without rhonchi or wheezing.

HEART:  RRR, S1, S2 present.  Peripheral pulses intact, no obvious murmurs were 

noted.

ABDOMEN:  Soft, nontender.  Positive bowel sounds no organomegaly, normal bowel 

sounds.

EXTREMITIES:  Without any cyanosis, clubbing, or edema.  Pedal pulses intact, 

Homans sign is negative.

NEUROLOGIC:  Normal speech, normal tone.  A and O x 3, moves all extremities, no

obvious focal deficits.

PSYCHIATRIC:  Normal affect, normal mood.  Stable.

SKIN:  No ulcerations or rashes, good skin turgor, no jaundice.

VASCULAR:  Good capillary refill, neurovascular bundle appears to be intact.



ASSESSMENT AND PLAN:  Angioedema. The patient will be admitted.  We will start 

IV Benadryl, IV Pepcid, IV Solu-Medrol.  Deep venous thrombosis prophylaxis.  

Full code.  IV hydration.







MARIE

DR: Rebecca   DD: 04/26/2022 13:42

DT: 04/26/2022 14:16   TID: [id number]

## 2022-04-26 NOTE — ED.ADGEN
Past Medical History


Past Medical History:  Cancer, Other


Additional Past Medical Histor:  OVARIAN CA,NO CURRENT TX, SCOLIOSIS


Past Surgical History:  


Additional Past Surgical Histo:  R OVARIAN CYST REMOVAL OVARIAN CA


Smoking Status:  Never Smoker


Alcohol Use:  Occasionally


Drug Use:  None





General Adult


EDM:


Chief Complaint:  ALLERGIC REACTION





HPI:


HPI:





Patient is a 37-year-old female who arrives ambulatory to the emergency 

department complaining of waking this morning with swelling of her upper lip.  

Patient reports this is happened previously and she took Benadryl prior to 

arrival.  Patient states she has some mild difficulty with swallowing.  Patient 

states despite this she is not had any new medications.  She further denies 

recent fevers however she has been recently diagnosed with ovarian cancer.  She 

denies any chest pain or shortness of air at this time.  She is awake, alert and

uncomfortable appearing.





Review of Systems:


Review of Systems:


Constitutional:   Denies fever or chills. []


Eyes:   Denies change in visual acuity. []


HENT:   Swelling of upper lip.  Denies nasal congestion or sore throat. [] 


Respiratory:   Denies cough or shortness of breath. [] 


Cardiovascular:   Denies chest pain or edema. [] 


GI:   Reports dysphagia.  Denies abdominal pain, nausea, vomiting, bloody stools

 or diarrhea. [] 


:  Denies dysuria. [] 


Musculoskeletal:   Denies back pain or joint pain. [] 


Integument:   Denies rash. [] 


Neurologic:   Denies headache, focal weakness or sensory changes. [] 


Endocrine:   Denies polyuria or polydipsia. [] 


Lymphatic:  Denies swollen glands. [] 


Psychiatric:  Denies depression or anxiety. []





Family History:


Family History:


Noncontributory





Current Medications:





Current Medications








 Medications


  (Trade)  Dose


 Ordered  Sig/Axel  Start Time


 Stop Time Status Last Admin


Dose Admin


 


 Diphenhydramine


 HCl


  (Benadryl)  50 mg  STK-MED ONCE  22 11:17


 22 11:17 DC  





 


 Famotidine


  (Pepcid Vial)  20 mg  STK-MED ONCE  22 11:17


 22 11:17 DC  





 


 Methylprednisolone


 Sodium Succinate


  (SOLU-Medrol


 125MG VIAL)  125 mg  1X  ONCE  22 11:45


 22 11:46 DC 22 11:23


125 MG


 


 Ondansetron HCl


  (Zofran)  4 mg  PRN Q8HRS  PRN  22 12:30


 22 12:29   





 


 Sodium Chloride  1,000 ml @ 


 1,000 mls/hr  1X  ONCE  22 12:30


 22 13:29 DC 22 12:30


1,000 MLS/HR











Allergies:


Allergies:





Allergies








Coded Allergies Type Severity Reaction Last Updated Verified


 


  No Known Drug Allergies    22 No











Physical Exam:


PE:





Constitutional: Well developed, well nourished, no acute distress, non-toxic 

appearance. []


HENT: Patient has features consistent with acute angioedema.  There is swelling 

of the upper lip which is quite impressive.  There is minimal swelling of the 

lower lip however the tongue appears to be normal-appearing.  Normocephalic, at

raumatic, bilateral external ears normal, oropharynx moist, no oral exudates, 

nose normal. []


Eyes: PERRLA, EOMI, conjunctiva normal, no discharge. [] 


Neck: Normal range of motion, no tenderness, supple, no stridor. [] 


Cardiovascular: Tachycardia.  No murmur []


Lungs & Thorax:  Bilateral breath sounds clear to auscultation []


Abdomen: Bowel sounds normal, soft, no tenderness, no masses, no pulsatile 

masses. [] 


Skin: Warm, dry, no erythema, no rash. [] 


Back: No tenderness, no CVA tenderness. [] 


Extremities: No tenderness, no cyanosis, no clubbing, ROM intact, no edema. [] 


Neurologic: Alert and oriented X 3, normal motor function, normal sensory 

function, no focal deficits noted. []


Psychologic: Affect normal, judgement normal, mood normal. []





Current Patient Data:


Labs:





                                Laboratory Tests








Test


 22


11:10


 


White Blood Count


 6.0 x10^3/uL


(4.0-11.0)


 


Red Blood Count


 4.25 x10^6/uL


(3.50-5.40)


 


Hemoglobin


 12.7 g/dL


(12.0-15.5)


 


Hematocrit


 37.9 %


(36.0-47.0)


 


Mean Corpuscular Volume


 89 fL ()





 


Mean Corpuscular Hemoglobin 30 pg (25-35)  


 


Mean Corpuscular Hemoglobin


Concent 34 g/dL


(31-37)


 


Red Cell Distribution Width


 13.2 %


(11.5-14.5)


 


Platelet Count


 352 x10^3/uL


(140-400)


 


Neutrophils (%) (Auto) 54 % (31-73)  


 


Lymphocytes (%) (Auto) 38 % (24-48)  


 


Monocytes (%) (Auto) 5 % (0-9)  


 


Eosinophils (%) (Auto) 3 % (0-3)  


 


Basophils (%) (Auto) 1 % (0-3)  


 


Neutrophils # (Auto)


 3.3 x10^3/uL


(1.8-7.7)


 


Lymphocytes # (Auto)


 2.3 x10^3/uL


(1.0-4.8)


 


Monocytes # (Auto)


 0.3 x10^3/uL


(0.0-1.1)


 


Eosinophils # (Auto)


 0.2 x10^3/uL


(0.0-0.7)


 


Basophils # (Auto)


 0.1 x10^3/uL


(0.0-0.2)





                                Laboratory Tests


22 11:10








Vital Signs:





                                   Vital Signs








  Date Time  Temp Pulse Resp B/P (MAP) Pulse Ox O2 Delivery O2 Flow Rate FiO2


 


22 12:10  108 16 133/98 (110) 100   


 


22 11:55      Room Air  











EKG:


EKG:


[]





Heart Score:


C/O Chest Pain:  No


Risk Factors:


Risk Factors:  DM, Current or recent (<one month) smoker, HTN, HLP, family 

history of CAD, obesity.


Risk Scores:


Score 0 - 3:  2.5% MACE over next 6 weeks - Discharge Home


Score 4 - 6:  20.3% MACE over next 6 weeks - Admit for Clinical Observation


Score 7 - 10:  72.7% MACE over next 6 weeks - Early Invasive Strategies





Radiology/Procedures:


Radiology/Procedures:


[]





Course & Med Decision Making:


Course & Med Decision Making


Pertinent Labs and Imaging studies reviewed. (See chart for details)





The patient remains awake, alert and does appear to be improved.  Nonetheless 

patient was taken to the room and IV access was established.  Patient was 

provided IV fluids as well as Benadryl, Pepcid and Solu-Medrol.  Patient does 

appear to not have any respiratory distress however she does not have any 

appreciable change in her swelling.  It does not appear that her intraoral 

mucosa is affected.  Nonetheless because she is experiencing some mild 

difficulty with swallowing, I advised that she be admitted to the hospital for 

continued treatment and observation.  The patient understands and has agreed to 

do so.  She has been admitted to the hospitalist [facility] for this purpose.  She is 

nontoxic-appearing and stable for transport to the floor.  []





Dragon Disclaimer:


Dragon Disclaimer:


This electronic medical record was generated, in whole or in part, using a voice

 recognition dictation system.





Departure


Departure


Impression:  


   Primary Impression:  


   Angioedema


   Additional Impression:  


   History of ovarian cancer


Disposition:   ADMITTED AS INPATIENT


Admitting Physician:  HIMS


Condition:  IMPROVED


Referrals:  


CONRADO CHICAS (PCP)





Problem Qualifiers











ERGULO BRO DO               2022 11:15

## 2022-04-27 VITALS — DIASTOLIC BLOOD PRESSURE: 87 MMHG | SYSTOLIC BLOOD PRESSURE: 138 MMHG

## 2022-04-27 VITALS — DIASTOLIC BLOOD PRESSURE: 79 MMHG | SYSTOLIC BLOOD PRESSURE: 120 MMHG

## 2022-04-27 VITALS — DIASTOLIC BLOOD PRESSURE: 89 MMHG | SYSTOLIC BLOOD PRESSURE: 125 MMHG

## 2022-04-27 RX ADMIN — OXYCODONE HYDROCHLORIDE AND ACETAMINOPHEN PRN TAB: 5; 325 TABLET ORAL at 06:25

## 2022-04-27 RX ADMIN — FAMOTIDINE SCH MG: 10 INJECTION, SOLUTION INTRAVENOUS at 10:06

## 2022-04-27 RX ADMIN — CYCLOBENZAPRINE HYDROCHLORIDE SCH MG: 10 TABLET, FILM COATED ORAL at 10:06

## 2022-04-27 RX ADMIN — METHYLPREDNISOLONE SODIUM SUCCINATE SCH MG: 40 INJECTION, POWDER, FOR SOLUTION INTRAMUSCULAR; INTRAVENOUS at 10:06

## 2022-04-27 NOTE — NUR
Pt left unit at 1340 by ambulation via private vehicle. Pt's IV removed without 
complication, VSS. Discharge instructions including medications, follow-up, and teaching 
discussed with pt. Pt verbalizes understanding.

## 2022-04-27 NOTE — DISCH
DISCHARGE INSTRUCTIONS


Condition on Discharge


Condition on Discharge:  Stable





Activity After Discharge


Activity Instructions for Disc:  Activity as tolerated


Exercise Instruction after Dis:  Walk 30 min, 5 x per week


Driving Instructions after Dis:  Do not drive today





Diet after Discharge


Diet after Discharge:  Cardiac, Regular





Checks after Discharge


Checks after discharge:  Check your Temp as needed


DC Comment:  C3-C4 levels drawn during this admission, please follow-up with the

results





Contacting the DR. after DC


Call your doctor for:  If your condition worsens (Worsening facial swelling or 

difficulty breathing)





Follow-Up


Follow up with:  PCP within 2 weeks of discharge, needs allergist/immunologist 

referral


Follow Up With:  Labs drawn during this admission











NICKY GUTIERREZ MD                  Apr 27, 2022 11:01

## 2022-04-28 LAB — C4 SERPL-MCNC: 24 MG/DL (ref 12–38)

## 2022-04-28 NOTE — PDOC3
[facility]-Discharge Summary


Date of Admission:


Date of Admission:  Apr 26, 2022





Date of Discharge:


Date of Discharge:  Apr 27, 2022





Discharge Diagnosis:


Discharge Diagnosis:


Angioedema.





Hospital Course:


Hospital Course:


37-year-old female who 


has a history of recurrent angioedema.  She is not sure what is causing it.  She


is on multiple medications. Once again, she presents with swollen lips.  It 


appears she has recurrent angioedema.  


We are going to admit the patient and give her steroids, Benadryl, and Pepcid.





By day of discharge patient's facial swelling has improved.  I have sent off for

C3-C4 levels.  Levels have not returned.  I have discussed with the patient to 

follow-up closely with her results.  I have also recommended for patient to 

follow-up closely with PCP for referral to an allergist/immunologist.  She will 

be discharged with prednisone taper and EpiPen.  Rest of hospital course was 

uneventful.





Disposition:


Disposition/Orders:  D/C to Home





Activity:


Activity:


Resume previous activity





Diet:


Diet:  Regular





Medications:


Home Meds


Active Scripts


Diphenhydramine Hcl (BENADRYL ALLERGY) 25 Mg Tablet, 1 TAB PO Q12HR PRN for 

facial swelling for 14 Days, #28 TAB 0 Refills


   Prov:NICKY GUTIERREZ MD         4/27/22


Prednisone (PREDNISONE) 20 Mg Tablet, 40 MG PO DAILY for angiodema for 5 Days, 

#10 TAB


   Prov:NICKY GUTIERREZ MD         4/27/22


Oxycodone/Apap 5-325 (PERCOCET 5-325 MG TABLET **) 1 Each Tablet, 1 TAB PO PRN 

Q6HRS PRN for PAIN for 3 Days, #12 TAB


   Prov:NICKY GUTIERREZ MD         4/27/22


Sumatriptan Succinate (IMITREX) 50 Mg Tablet, 1 TAB PO UD, #9 TAB 1 Refill


   Prov:PANCHO BALL         9/30/18


Tramadol Hcl (ULTRAM) 50 Mg Tablet, 1 TAB PO Q6HRS, #30 TAB


   Prov:PANCHO BALL APRN         10/25/17


Cyclobenzaprine Hcl (CYCLOBENZAPRINE HCL) 10 Mg Tablet, 1 TAB PO TID, #30 TAB


   Prov:PANCHO BALL         4/19/17


Albuterol Sulfate (PROAIR HFA INHALER) 8.5 Gm Hfa.aer.ad, 2 PUFF INH PRN Q6HRS 

PRN for SHORTNESS OF BREATH, #1 INHALER 0 Refills


   Prov:BETI LOTT         10/20/16


Discontinued Scripts


Oxycodone/Apap 5-325 (PERCOCET 5-325 MG TABLET **) 1 Each Tablet, 1 TAB PO PRN 

Q6HRS PRN for PAIN, #30 TAB 0 Refills


   Prov:DELILAH FORBES Jr., MD         1/6/22


Scheduled


Cyclobenzaprine Hcl (Cyclobenzaprine Hcl), 1 TAB PO TID


Prednisone (Prednisone), 40 MG PO DAILY


Sumatriptan Succinate (Imitrex), 1 TAB PO UD


Tramadol Hcl (Ultram), 1 TAB PO Q6HRS





Scheduled PRN


Albuterol Sulfate (Proair Hfa Inhaler), 2 PUFF INH PRN Q6HRS PRN for SHORTNESS 

OF BREATH


Diphenhydramine Hcl (Benadryl Allergy), 1 TAB PO Q12HR PRN for facial swelling


Oxycodone/Apap 5-325 (Percocet 5-325 Mg Tablet **), 1 TAB PO PRN Q6HRS PRN for 

PAIN





Discontinued Medications


Oxycodone/Apap 5-325 (Percocet 5-325 Mg Tablet **), 1 TAB PO PRN Q6HRS PRN for 

PAIN





Total Time:


Total Time:


Total time spent was 32 minutes in preparing scripts, discharge planning with 

SWI and RN and preparing this discharge summary





Patient seen and examined on day of discharge.  No acute abnormal findings.





Justicifation of Admission Dx:


Justifications for Admission:


Justification of Admission Dx:  N/A











NICKY GUTIERREZ MD                  Apr 28, 2022 13:16 Decreased productivity/Increased latency